# Patient Record
Sex: FEMALE | Race: WHITE | NOT HISPANIC OR LATINO | URBAN - METROPOLITAN AREA
[De-identification: names, ages, dates, MRNs, and addresses within clinical notes are randomized per-mention and may not be internally consistent; named-entity substitution may affect disease eponyms.]

---

## 2024-02-05 ENCOUNTER — NURSING HOME VISIT (OUTPATIENT)
Dept: POST ACUTE CARE | Facility: EXTERNAL LOCATION | Age: 79
End: 2024-02-05
Payer: COMMERCIAL

## 2024-02-05 DIAGNOSIS — G30.9 MILD ALZHEIMER'S DEMENTIA WITHOUT BEHAVIORAL DISTURBANCE, PSYCHOTIC DISTURBANCE, MOOD DISTURBANCE, OR ANXIETY, UNSPECIFIED TIMING OF DEMENTIA ONSET (MULTI): ICD-10-CM

## 2024-02-05 DIAGNOSIS — F02.A0 MILD ALZHEIMER'S DEMENTIA WITHOUT BEHAVIORAL DISTURBANCE, PSYCHOTIC DISTURBANCE, MOOD DISTURBANCE, OR ANXIETY, UNSPECIFIED TIMING OF DEMENTIA ONSET (MULTI): ICD-10-CM

## 2024-02-05 DIAGNOSIS — S32.511D CLOSED FRACTURE OF SUPERIOR RAMUS OF RIGHT PUBIS WITH ROUTINE HEALING, SUBSEQUENT ENCOUNTER: ICD-10-CM

## 2024-02-05 DIAGNOSIS — G89.29 OTHER CHRONIC PAIN: ICD-10-CM

## 2024-02-05 DIAGNOSIS — S32.591D CLOSED FRACTURE OF RIGHT INFERIOR PUBIC RAMUS WITH ROUTINE HEALING, SUBSEQUENT ENCOUNTER: ICD-10-CM

## 2024-02-05 DIAGNOSIS — E03.9 ACQUIRED HYPOTHYROIDISM: ICD-10-CM

## 2024-02-05 DIAGNOSIS — N18.9 CHRONIC KIDNEY DISEASE, UNSPECIFIED CKD STAGE: ICD-10-CM

## 2024-02-05 DIAGNOSIS — Z99.81 OXYGEN DEPENDENT: ICD-10-CM

## 2024-02-05 DIAGNOSIS — M79.89 LEG SWELLING: ICD-10-CM

## 2024-02-05 DIAGNOSIS — J43.9 PULMONARY EMPHYSEMA, UNSPECIFIED EMPHYSEMA TYPE (MULTI): ICD-10-CM

## 2024-02-05 DIAGNOSIS — I10 BENIGN ESSENTIAL HTN: ICD-10-CM

## 2024-02-05 DIAGNOSIS — M79.604 PAIN OF RIGHT LOWER EXTREMITY: Primary | ICD-10-CM

## 2024-02-05 PROBLEM — S32.591A: Status: ACTIVE | Noted: 2024-02-05

## 2024-02-05 PROCEDURE — 99497 ADVNCD CARE PLAN 30 MIN: CPT | Performed by: INTERNAL MEDICINE

## 2024-02-05 PROCEDURE — 99306 1ST NF CARE HIGH MDM 50: CPT | Performed by: INTERNAL MEDICINE

## 2024-02-05 NOTE — PROGRESS NOTES
Nursing Home  History & Physical Visit    Name: Rosa Maria Jason  MRN: [id number]  YOB: 1945  Date of Service: 2/5/2024      History of Present Illness  Pt was taken to hosp with leg pain and leg swelling more so on R side . She was in hosp recently with sup and inf rami fracture . She declined to go to rehab ctr after that and went home where she started to detoriate so went back to hosp. She was evaluated by therapist and suggested skilled care so she is her for it.   She has hx of dementia, on aricept for few years, hx of o2 dependent copd , uses 5 L of o2, ckd, HTN, hypothyroid   Lives with her daughter  Quit smoking since she has been on oxygen     Review of Systems  REVIEW OF SYSTEMS:   All other systems have been reviewed and are negative in relation to patient's complaint and other than what is mentioned in History of present illness.     Vital Signs  BP: 115/77 mmHg  2/5/2024 08:50  Temp:97.8 °F  2/5/2024 08:50  Pulse:79 bpm  2/5/2024 08:50  Weight:175 Lbs  2/2/2024 17:00  Resp:20 Breaths/min  2/5/2024 08:50  BS:  O2:92 %  2/5/2024 08:50  Pain:3  2/5/2024 08:4  Physical Exam  Vitals noted , on oxygen   Not in acute distress  Conj Pink, No icterus  Neck: No Cervical LN enlargement, No Thyroid enlargement   Lungs: good air entry bilaterally, no rales or rhonchi  CVS: S1 S2 + , no S3. No loud heart murmur heard.   Abdomen: Soft, non tender , BS +. no organomegaly , no CVA tenderness  CNS: Pt is alert, moving all 4 extremities. no motor weakness or abnormal movements noted on gross examination.  No spine tenderness  Extremities: + edema, No calf tenderness, Halina's sign negative. Some tenderness in R groin area .     Assessment/Plan:    1. Pain of right lower extremity    2. Leg swelling    3. Closed fracture of superior ramus of right pubis with routine healing, subsequent encounter    4. Closed fracture of right inferior pubic ramus with routine healing, subsequent encounter    5. Other chronic  pain    6. Pulmonary emphysema, unspecified emphysema type (CMS/[facility])    7. Oxygen dependent    8. 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 Benign essential HTN    10. Chronic kidney disease, unspecified CKD stage    11. Acquired hypothyroidism         Plan:     Available medical records reviewed . Patient was examined and detailed History and physical done . All questions answered to patient's satisfaction . Total time for chart reviewing , detailed examination and coordinating care with patient was > 35 minutes.   During visit today , I asked patient as well as looked in records  in regards to advanced directive , POA etc. Pt wants to be Full code . Questions related to it answered to pt's satisfaction .  She wants her daughter to be her health POA   Pain control  Patient is doing well.   Continue OT/PT Rehab.   Current medications are effective. advised to continue current medications.  Will continue to follow   ELOS 2 weeks   Patient felt satisfied with plan

## 2024-02-05 NOTE — LETTER
Patient: Rosa Maria Jason  : 1945    Encounter Date: 2024    Nursing Home  History & Physical Visit    Name: Rosa Maria Jason  MRN: [id number]  YOB: 1945  Date of Service: 2024      History of Present Illness  Pt was taken to hosp with leg pain and leg swelling more so on R side . She was in hosp recently with sup and inf rami fracture . She declined to go to rehab ctr after that and went home where she started to detoriate so went back to hosp. She was evaluated by therapist and suggested skilled care so she is her for it.   She has hx of dementia, on aricept for few years, hx of o2 dependent copd , uses 5 L of o2, ckd, HTN, hypothyroid   Lives with her daughter  Quit smoking since she has been on oxygen     Review of Systems  REVIEW OF SYSTEMS:   All other systems have been reviewed and are negative in relation to patient's complaint and other than what is mentioned in History of present illness.     Vital Signs  BP: 115/77 mmHg  2024 08:50  Temp:97.8 °F  2024 08:50  Pulse:79 bpm  2024 08:50  Weight:175 Lbs  2024 17:00  Resp:20 Breaths/min  2024 08:50  BS:  O2:92 %  2024 08:50  Pain:3  2024 08:4  Physical Exam  Vitals noted , on oxygen   Not in acute distress  Conj Pink, No icterus  Neck: No Cervical LN enlargement, No Thyroid enlargement   Lungs: good air entry bilaterally, no rales or rhonchi  CVS: S1 S2 + , no S3. No loud heart murmur heard.   Abdomen: Soft, non tender , BS +. no organomegaly , no CVA tenderness  CNS: Pt is alert, moving all 4 extremities. no motor weakness or abnormal movements noted on gross examination.  No spine tenderness  Extremities: + edema, No calf tenderness, Halina's sign negative. Some tenderness in R groin area .     Assessment/Plan:    1. Pain of right lower extremity    2. Leg swelling    3. Closed fracture of superior ramus of right pubis with routine healing, subsequent encounter    4. Closed fracture of right inferior pubic  ramus with routine healing, subsequent encounter    5. Other chronic pain    6. Pulmonary emphysema, unspecified emphysema type (CMS/HCC)    7. Oxygen dependent    8. 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 Benign essential HTN    10. Chronic kidney disease, unspecified CKD stage    11. Acquired hypothyroidism         Plan:     Available medical records reviewed . Patient was examined and detailed History and physical done . All questions answered to patient's satisfaction . Total time for chart reviewing , detailed examination and coordinating care with patient was > 35 minutes.   During visit today , I asked patient as well as looked in records  in regards to advanced directive , POA etc. Pt wants to be Full code . Questions related to it answered to pt's satisfaction .  She wants her daughter to be her health POA   Pain control  Patient is doing well.   Continue OT/PT Rehab.   Current medications are effective. advised to continue current medications.  Will continue to follow   ELOS 2 weeks   Patient felt satisfied with plan      Electronically Signed By: Blair Rousseau MD   2/5/24 12:51 PM

## 2024-02-09 ENCOUNTER — NURSING HOME VISIT (OUTPATIENT)
Dept: POST ACUTE CARE | Facility: EXTERNAL LOCATION | Age: 79
End: 2024-02-09
Payer: COMMERCIAL

## 2024-02-09 DIAGNOSIS — G30.9 MILD ALZHEIMER'S DEMENTIA WITHOUT BEHAVIORAL DISTURBANCE, PSYCHOTIC DISTURBANCE, MOOD DISTURBANCE, OR ANXIETY, UNSPECIFIED TIMING OF DEMENTIA ONSET (MULTI): ICD-10-CM

## 2024-02-09 DIAGNOSIS — Z99.81 OXYGEN DEPENDENT: ICD-10-CM

## 2024-02-09 DIAGNOSIS — M79.604 PAIN OF RIGHT LOWER EXTREMITY: Primary | ICD-10-CM

## 2024-02-09 DIAGNOSIS — S32.511D CLOSED FRACTURE OF SUPERIOR RAMUS OF RIGHT PUBIS WITH ROUTINE HEALING, SUBSEQUENT ENCOUNTER: ICD-10-CM

## 2024-02-09 DIAGNOSIS — F02.A0 MILD ALZHEIMER'S DEMENTIA WITHOUT BEHAVIORAL DISTURBANCE, PSYCHOTIC DISTURBANCE, MOOD DISTURBANCE, OR ANXIETY, UNSPECIFIED TIMING OF DEMENTIA ONSET (MULTI): ICD-10-CM

## 2024-02-09 DIAGNOSIS — S32.591D CLOSED FRACTURE OF RIGHT INFERIOR PUBIC RAMUS WITH ROUTINE HEALING, SUBSEQUENT ENCOUNTER: ICD-10-CM

## 2024-02-09 PROCEDURE — 99308 SBSQ NF CARE LOW MDM 20: CPT | Performed by: INTERNAL MEDICINE

## 2024-02-09 NOTE — LETTER
Patient: Rosa Maria Jason  : 1945    Encounter Date: 2024        Nursing home follow up visit  Name: Rosa Maria Jason  MRN: [id number]  YOB: 1945  Date of Service: 2024      Pt was evaluated during nursing home visit today  Patient is doing OK. Participating in therapy well  No sob, cp, PND, orthopnea  Eating ok, sleeping ok   Moving BM ok.   Tolerating medications well    Objective :  Vitals were noted  Patient is not any acute distress  Conjunctiva- Pink, no icterus   No cervical LN enlargement   Lungs clear, s1s2 +   No edema , No calf tenderness   Pleasantly demented.    Assessment:    1. Pain of right lower extremity -Improving    2. Closed fracture of superior ramus of right pubis with routine healing, subsequent encounter    3. Closed fracture of right inferior pubic ramus with routine healing, subsequent encounter    4. Oxygen dependent    5. 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   Continue OT/PT Rehab.   Current medications are effective. advised to continue current medications.  Will continue to follow   Patient felt satisfied with plan            Electronically Signed By: Blair Rousseau MD   24  4:46 PM

## 2024-02-11 NOTE — PROGRESS NOTES
Nursing home follow up visit  Name: Rosa Maria Jason  MRN: [id number]  YOB: 1945  Date of Service:2/9/24      Pt was evaluated during nursing home visit today  Patient is doing OK. Participating in therapy well  No sob, cp, PND, orthopnea  Eating ok, sleeping ok   Moving BM ok.   Tolerating medications well    Objective :  Vitals were noted  Patient is not any acute distress  Conjunctiva- Pink, no icterus   No cervical LN enlargement   Lungs clear, s1s2 +   No edema , No calf tenderness   Pleasantly demented.    Assessment:    1. Pain of right lower extremity -Improving    2. Closed fracture of superior ramus of right pubis with routine healing, subsequent encounter    3. Closed fracture of right inferior pubic ramus with routine healing, subsequent encounter    4. Oxygen dependent    5. 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   Continue OT/PT Rehab.   Current medications are effective. advised to continue current medications.  Will continue to follow   Patient felt satisfied with plan

## 2024-02-12 ENCOUNTER — NURSING HOME VISIT (OUTPATIENT)
Dept: POST ACUTE CARE | Facility: EXTERNAL LOCATION | Age: 79
End: 2024-02-12
Payer: COMMERCIAL

## 2024-02-12 DIAGNOSIS — G30.9 MILD ALZHEIMER'S DEMENTIA WITHOUT BEHAVIORAL DISTURBANCE, PSYCHOTIC DISTURBANCE, MOOD DISTURBANCE, OR ANXIETY, UNSPECIFIED TIMING OF DEMENTIA ONSET (MULTI): ICD-10-CM

## 2024-02-12 DIAGNOSIS — S32.511D CLOSED FRACTURE OF SUPERIOR RAMUS OF RIGHT PUBIS WITH ROUTINE HEALING, SUBSEQUENT ENCOUNTER: Primary | ICD-10-CM

## 2024-02-12 DIAGNOSIS — S32.591D CLOSED FRACTURE OF RIGHT INFERIOR PUBIC RAMUS WITH ROUTINE HEALING, SUBSEQUENT ENCOUNTER: ICD-10-CM

## 2024-02-12 DIAGNOSIS — J43.9 PULMONARY EMPHYSEMA, UNSPECIFIED EMPHYSEMA TYPE (MULTI): ICD-10-CM

## 2024-02-12 DIAGNOSIS — F02.A0 MILD ALZHEIMER'S DEMENTIA WITHOUT BEHAVIORAL DISTURBANCE, PSYCHOTIC DISTURBANCE, MOOD DISTURBANCE, OR ANXIETY, UNSPECIFIED TIMING OF DEMENTIA ONSET (MULTI): ICD-10-CM

## 2024-02-12 DIAGNOSIS — Z99.81 OXYGEN DEPENDENT: ICD-10-CM

## 2024-02-12 PROCEDURE — 99308 SBSQ NF CARE LOW MDM 20: CPT | Performed by: INTERNAL MEDICINE

## 2024-02-12 NOTE — PROGRESS NOTES
Nursing home follow up visit  Name: Rosa Maria Jason  MRN: [id number]  YOB: 1945  Date of Service: 2/12/2024      Pt was evaluated during nursing home visit today  Patient is doing OK. Participating in therapy well  No sob, cp, PND, orthopnea . Uses oxygen   Eating ok, sleeping ok   Moving BM ok.   Tolerating medications well    Objective :  Vitals were noted , on continuous o2 .   Patient is not any acute distress  Conjunctiva- Pink, no icterus   No cervical LN enlargement   Lungs clear, s1s2 +   No edema , No calf tenderness     Assessment:    1. Closed fracture of superior ramus of right pubis with routine healing, subsequent encounter    2. Closed fracture of right inferior pubic ramus with routine healing, subsequent encounter    3. Oxygen dependent    4. 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 Pulmonary emphysema, unspecified emphysema type (CMS/HCC)         Plan:  Patient is doing well.   Continue OT/PT Rehab.   Current medications are effective. advised to continue current medications.  Will continue to follow   Patient felt satisfied with plan

## 2024-02-12 NOTE — LETTER
Patient: Rosa Maria Jason  : 1945    Encounter Date: 2024        Nursing home follow up visit  Name: Rosa Maria Jason  MRN: [id number]  YOB: 1945  Date of Service: 2024      Pt was evaluated during nursing home visit today  Patient is doing OK. Participating in therapy well  No sob, cp, PND, orthopnea . Uses oxygen   Eating ok, sleeping ok   Moving BM ok.   Tolerating medications well    Objective :  Vitals were noted , on continuous o2 .   Patient is not any acute distress  Conjunctiva- Pink, no icterus   No cervical LN enlargement   Lungs clear, s1s2 +   No edema , No calf tenderness     Assessment:    1. Closed fracture of superior ramus of right pubis with routine healing, subsequent encounter    2. Closed fracture of right inferior pubic ramus with routine healing, subsequent encounter    3. Oxygen dependent    4. 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 Pulmonary emphysema, unspecified emphysema type (CMS/HCC)         Plan:  Patient is doing well.   Continue OT/PT Rehab.   Current medications are effective. advised to continue current medications.  Will continue to follow   Patient felt satisfied with plan            Electronically Signed By: Blair Rousseau MD   24  6:18 PM

## 2024-02-16 ENCOUNTER — NURSING HOME VISIT (OUTPATIENT)
Dept: POST ACUTE CARE | Facility: EXTERNAL LOCATION | Age: 79
End: 2024-02-16
Payer: COMMERCIAL

## 2024-02-16 DIAGNOSIS — J43.9 PULMONARY EMPHYSEMA, UNSPECIFIED EMPHYSEMA TYPE (MULTI): ICD-10-CM

## 2024-02-16 DIAGNOSIS — Z75.8 DISCHARGE PLANNING ISSUES: ICD-10-CM

## 2024-02-16 DIAGNOSIS — S32.511D CLOSED FRACTURE OF SUPERIOR RAMUS OF RIGHT PUBIS WITH ROUTINE HEALING, SUBSEQUENT ENCOUNTER: Primary | ICD-10-CM

## 2024-02-16 DIAGNOSIS — I10 BENIGN ESSENTIAL HTN: ICD-10-CM

## 2024-02-16 DIAGNOSIS — S32.591D CLOSED FRACTURE OF RIGHT INFERIOR PUBIC RAMUS WITH ROUTINE HEALING, SUBSEQUENT ENCOUNTER: ICD-10-CM

## 2024-02-16 DIAGNOSIS — Z99.81 OXYGEN DEPENDENT: ICD-10-CM

## 2024-02-16 PROCEDURE — 99316 NF DSCHRG MGMT 30 MIN+: CPT | Performed by: INTERNAL MEDICINE

## 2024-02-16 NOTE — LETTER
Patient: Rosa Maria Jason  : 1945    Encounter Date: 2024    Nursing home visit  Name: Rosa Maria Jason  MRN: [id number]  YOB: 1945  Date of Service: 24      Pt was evaluated for periodic clinical evaluation today   Patient is doing OK.  No sob, cp, PND, orthopnea  Eating ok, sleeping ok   Moving BM ok.   No significant pain issue  Tolerating medications well  She has improved in therapy . Able to walk with walker and able to go to bathroom by herself    Objective :  2024 22:50   Blood Pressure: 149 / 85 mmHg  Marylin Arellano RN    2024 22:50   O2 Saturation: 97 %  Marylni Arellano RN    2024 22:50   Pulse: 72 per minute  Marylin Arellano RN    2024 22:50   Respirations: 17 per minute  Marylin Arellano RN    2024 22:50   Temperature: 97.5 °F  Marylin Arellano RN    2024 22:49   Pain: 0 of 10  Vitals were noted  Patient is not any acute distress  Conjunctiva- Pink, no icterus   No cervical LN enlargement   Lungs clear, s1s2 +   No edema , No calf tenderness     Assessment:    1. Closed fracture of superior ramus of right pubis with routine healing, subsequent encounter    2. Pulmonary emphysema, unspecified emphysema type (CMS/HCC) -o2 dependent   3. Closed fracture of right inferior pubic ramus with routine healing, subsequent encounter    4. Oxygen dependent    5. Discharge planning issues    6. Benign essential HTN -controlled          Plan:  Patient is doing well.   All discharge related questions, medication questions were discussed with pt. Plan also discussed with . Total time > 35 min.     Current medications are effective. advised to continue current medications.  Plan is to discharge home tomorrow.   Patient felt satisfied with plan      Electronically Signed By: Blair Rousseau MD   24 12:20 AM

## 2024-02-18 NOTE — PROGRESS NOTES
Nursing home visit  Name: Rosa Maria Jason  MRN: [id number]  YOB: 1945  Date of Service: 2/16/24      Pt was evaluated for periodic clinical evaluation today   Patient is doing OK.  No sob, cp, PND, orthopnea  Eating ok, sleeping ok   Moving BM ok.   No significant pain issue  Tolerating medications well  She has improved in therapy . Able to walk with walker and able to go to bathroom by herself    Objective :  02/16/2024 22:50   Blood Pressure: 149 / 85 mmHg  Marylin Arellano RN    02/16/2024 22:50   O2 Saturation: 97 %  Marylin Arellano RN    02/16/2024 22:50   Pulse: 72 per minute  Marylin Arellano RN    02/16/2024 22:50   Respirations: 17 per minute  Marylin Arellano RN    02/16/2024 22:50   Temperature: 97.5 °F  Marylin Arellano RN    02/16/2024 22:49   Pain: 0 of 10  Vitals were noted  Patient is not any acute distress  Conjunctiva- Pink, no icterus   No cervical LN enlargement   Lungs clear, s1s2 +   No edema , No calf tenderness     Assessment:    1. Closed fracture of superior ramus of right pubis with routine healing, subsequent encounter    2. Pulmonary emphysema, unspecified emphysema type (CMS/HCC) -o2 dependent   3. Closed fracture of right inferior pubic ramus with routine healing, subsequent encounter    4. Oxygen dependent    5. Discharge planning issues    6. Benign essential HTN -controlled          Plan:  Patient is doing well.   All discharge related questions, medication questions were discussed with pt. Plan also discussed with . Total time > 35 min.     Current medications are effective. advised to continue current medications.  Plan is to discharge home tomorrow.   Patient felt satisfied with plan

## 2024-02-19 ENCOUNTER — NURSING HOME VISIT (OUTPATIENT)
Dept: POST ACUTE CARE | Facility: EXTERNAL LOCATION | Age: 79
End: 2024-02-19
Payer: COMMERCIAL

## 2024-02-19 DIAGNOSIS — S32.591D CLOSED FRACTURE OF RIGHT INFERIOR PUBIC RAMUS WITH ROUTINE HEALING, SUBSEQUENT ENCOUNTER: ICD-10-CM

## 2024-02-19 DIAGNOSIS — Z99.81 OXYGEN DEPENDENT: ICD-10-CM

## 2024-02-19 DIAGNOSIS — S32.511D CLOSED FRACTURE OF SUPERIOR RAMUS OF RIGHT PUBIS WITH ROUTINE HEALING, SUBSEQUENT ENCOUNTER: Primary | ICD-10-CM

## 2024-02-19 PROCEDURE — 99309 SBSQ NF CARE MODERATE MDM 30: CPT | Performed by: INTERNAL MEDICINE

## 2024-02-19 NOTE — LETTER
Patient: Rosa Maria Jason  : 1945    Encounter Date: 2024        Nursing home follow up visit  Name: Rosa Maria Jason  MRN: [id number]  YOB: 1945  Date of Service: 2024      Pt was evaluated during nursing home visit today  Patient is doing OK. Participating in therapy well  Her discharge was postponed as she still needs wheel chair arrangements to be made for her home . Also   She is supposed to have f/U xray on pubic bones for f/U on her recent fx and results will be sent to ortho.   No sob, cp, PND, orthopnea  Eating ok, sleeping ok   Moving BM ok.   Tolerating medications well    Objective :  Vitals were noted, stable  Patient is not any acute distress  Conjunctiva- Pink, no icterus   No cervical LN enlargement   Lungs clear, s1s2 +   Able to flex R leg ok. C/O some pain near groin with movements .   No edema , No calf tenderness     Assessment:    1. Closed fracture of superior ramus of right pubis with routine healing, subsequent encounter    2. Closed fracture of right inferior pubic ramus with routine healing, subsequent encounter    3. Oxygen dependent         Plan:  Patient is doing well.   Continue OT/PT Rehab.   Xray ordered to be done on 24. It will be sent to her Ortho to get input   Current medications are effective. advised to continue current medications.  Will continue to follow   Patient felt satisfied with plan            Electronically Signed By: Blair Rousseau MD   24 12:46 PM

## 2024-02-19 NOTE — PROGRESS NOTES
Nursing home follow up visit  Name: Rosa Maria Jason  MRN: [id number]  YOB: 1945  Date of Service: 2/19/2024      Pt was evaluated during nursing home visit today  Patient is doing OK. Participating in therapy well  Her discharge was postponed as she still needs wheel chair arrangements to be made for her home . Also   She is supposed to have f/U xray on pubic bones for f/U on her recent fx and results will be sent to ortho.   No sob, cp, PND, orthopnea  Eating ok, sleeping ok   Moving BM ok.   Tolerating medications well    Objective :  Vitals were noted, stable  Patient is not any acute distress  Conjunctiva- Pink, no icterus   No cervical LN enlargement   Lungs clear, s1s2 +   Able to flex R leg ok. C/O some pain near groin with movements .   No edema , No calf tenderness     Assessment:    1. Closed fracture of superior ramus of right pubis with routine healing, subsequent encounter    2. Closed fracture of right inferior pubic ramus with routine healing, subsequent encounter    3. Oxygen dependent         Plan:  Patient is doing well.   Continue OT/PT Rehab.   Xray ordered to be done on 2/21/24. It will be sent to her Ortho to get input   Current medications are effective. advised to continue current medications.  Will continue to follow   Patient felt satisfied with plan

## 2024-04-29 DIAGNOSIS — I10 BENIGN ESSENTIAL HTN: Primary | ICD-10-CM

## 2024-04-29 RX ORDER — POTASSIUM CHLORIDE 1500 MG/1
20 TABLET, EXTENDED RELEASE ORAL DAILY
COMMUNITY
Start: 2024-02-27 | End: 2024-04-29 | Stop reason: SDUPTHER

## 2024-04-29 RX ORDER — POTASSIUM CHLORIDE 1500 MG/1
20 TABLET, EXTENDED RELEASE ORAL DAILY
Qty: 90 TABLET | Refills: 3 | Status: SHIPPED | OUTPATIENT
Start: 2024-04-29 | End: 2024-05-13 | Stop reason: SDUPTHER

## 2024-05-13 DIAGNOSIS — I10 BENIGN ESSENTIAL HTN: ICD-10-CM

## 2024-05-13 RX ORDER — POTASSIUM CHLORIDE 1500 MG/1
20 TABLET, EXTENDED RELEASE ORAL DAILY
Qty: 90 TABLET | Refills: 3 | Status: SHIPPED | OUTPATIENT
Start: 2024-05-13

## 2024-05-13 RX ORDER — DONEPEZIL HYDROCHLORIDE 5 MG/1
5 TABLET, FILM COATED ORAL NIGHTLY
COMMUNITY
End: 2024-05-13 | Stop reason: SDUPTHER

## 2024-05-13 RX ORDER — DONEPEZIL HYDROCHLORIDE 5 MG/1
5 TABLET, FILM COATED ORAL NIGHTLY
Qty: 90 TABLET | Refills: 3 | Status: SHIPPED | OUTPATIENT
Start: 2024-05-13

## 2024-08-03 DIAGNOSIS — E78.5 HYPERLIPIDEMIA, UNSPECIFIED HYPERLIPIDEMIA TYPE: Primary | ICD-10-CM

## 2024-08-05 RX ORDER — ATORVASTATIN CALCIUM 20 MG/1
20 TABLET, FILM COATED ORAL NIGHTLY
Qty: 30 TABLET | Refills: 11 | Status: SHIPPED | OUTPATIENT
Start: 2024-08-05

## 2025-01-07 ENCOUNTER — APPOINTMENT (OUTPATIENT)
Dept: ORTHOPEDIC SURGERY | Facility: HOSPITAL | Age: 80
End: 2025-01-07
Payer: COMMERCIAL

## 2025-01-17 ENCOUNTER — APPOINTMENT (OUTPATIENT)
Dept: RADIOLOGY | Facility: HOSPITAL | Age: 80
End: 2025-01-17
Payer: COMMERCIAL

## 2025-04-01 ENCOUNTER — APPOINTMENT (OUTPATIENT)
Dept: RADIOLOGY | Facility: HOSPITAL | Age: 80
End: 2025-04-01
Payer: COMMERCIAL

## 2025-04-01 ENCOUNTER — APPOINTMENT (OUTPATIENT)
Dept: CARDIOLOGY | Facility: HOSPITAL | Age: 80
End: 2025-04-01
Payer: COMMERCIAL

## 2025-04-01 ENCOUNTER — HOSPITAL ENCOUNTER (INPATIENT)
Facility: HOSPITAL | Age: 80
LOS: 1 days | Discharge: HOME | End: 2025-04-03
Attending: EMERGENCY MEDICINE | Admitting: INTERNAL MEDICINE
Payer: COMMERCIAL

## 2025-04-01 DIAGNOSIS — E87.29 RESPIRATORY ACIDOSIS: ICD-10-CM

## 2025-04-01 DIAGNOSIS — J18.9 COMMUNITY ACQUIRED PNEUMONIA OF LEFT LOWER LOBE OF LUNG: ICD-10-CM

## 2025-04-01 DIAGNOSIS — R41.82 ALTERED MENTAL STATUS, UNSPECIFIED ALTERED MENTAL STATUS TYPE: Primary | ICD-10-CM

## 2025-04-01 LAB
ALBUMIN SERPL BCP-MCNC: 3.4 G/DL (ref 3.4–5)
ALP SERPL-CCNC: 42 U/L (ref 33–136)
ALT SERPL W P-5'-P-CCNC: 10 U/L (ref 7–45)
ANION GAP BLDV CALCULATED.4IONS-SCNC: 5 MMOL/L (ref 10–25)
ANION GAP SERPL CALC-SCNC: 10 MMOL/L (ref 10–20)
AST SERPL W P-5'-P-CCNC: 15 U/L (ref 9–39)
BASE EXCESS BLDV CALC-SCNC: 3.5 MMOL/L (ref -2–3)
BASOPHILS # BLD AUTO: 0.04 X10*3/UL (ref 0–0.1)
BASOPHILS NFR BLD AUTO: 0.5 %
BILIRUB SERPL-MCNC: 0.3 MG/DL (ref 0–1.2)
BODY TEMPERATURE: 37 DEGREES CELSIUS
BUN SERPL-MCNC: 20 MG/DL (ref 6–23)
CA-I BLDV-SCNC: 1.12 MMOL/L (ref 1.1–1.33)
CALCIUM SERPL-MCNC: 7.6 MG/DL (ref 8.6–10.3)
CARDIAC TROPONIN I PNL SERPL HS: 3 NG/L (ref 0–13)
CARDIAC TROPONIN I PNL SERPL HS: 3 NG/L (ref 0–13)
CHLORIDE BLDV-SCNC: 106 MMOL/L (ref 98–107)
CHLORIDE SERPL-SCNC: 107 MMOL/L (ref 98–107)
CO2 SERPL-SCNC: 28 MMOL/L (ref 21–32)
CREAT SERPL-MCNC: 1.55 MG/DL (ref 0.5–1.05)
EGFRCR SERPLBLD CKD-EPI 2021: 34 ML/MIN/1.73M*2
EOSINOPHIL # BLD AUTO: 0.2 X10*3/UL (ref 0–0.4)
EOSINOPHIL NFR BLD AUTO: 2.4 %
ERYTHROCYTE [DISTWIDTH] IN BLOOD BY AUTOMATED COUNT: 15.5 % (ref 11.5–14.5)
FLUAV RNA RESP QL NAA+PROBE: NOT DETECTED
FLUBV RNA RESP QL NAA+PROBE: NOT DETECTED
GLUCOSE BLD MANUAL STRIP-MCNC: 85 MG/DL (ref 74–99)
GLUCOSE BLDV-MCNC: 79 MG/DL (ref 74–99)
GLUCOSE SERPL-MCNC: 79 MG/DL (ref 74–99)
HCO3 BLDV-SCNC: 31 MMOL/L (ref 22–26)
HCT VFR BLD AUTO: 30.1 % (ref 36–46)
HCT VFR BLD EST: 29 % (ref 36–46)
HGB BLD-MCNC: 9.4 G/DL (ref 12–16)
HGB BLDV-MCNC: 9.7 G/DL (ref 12–16)
IMM GRANULOCYTES # BLD AUTO: 0.03 X10*3/UL (ref 0–0.5)
IMM GRANULOCYTES NFR BLD AUTO: 0.4 % (ref 0–0.9)
INHALED O2 CONCENTRATION: 40 %
LACTATE BLDV-SCNC: 1.7 MMOL/L (ref 0.4–2)
LACTATE SERPL-SCNC: 1.7 MMOL/L (ref 0.4–2)
LYMPHOCYTES # BLD AUTO: 2.15 X10*3/UL (ref 0.8–3)
LYMPHOCYTES NFR BLD AUTO: 26 %
MCH RBC QN AUTO: 33.1 PG (ref 26–34)
MCHC RBC AUTO-ENTMCNC: 31.2 G/DL (ref 32–36)
MCV RBC AUTO: 106 FL (ref 80–100)
MONOCYTES # BLD AUTO: 0.96 X10*3/UL (ref 0.05–0.8)
MONOCYTES NFR BLD AUTO: 11.6 %
NEUTROPHILS # BLD AUTO: 4.9 X10*3/UL (ref 1.6–5.5)
NEUTROPHILS NFR BLD AUTO: 59.1 %
NRBC BLD-RTO: 0 /100 WBCS (ref 0–0)
OXYHGB MFR BLDV: 75.2 % (ref 45–75)
PCO2 BLDV: 63 MM HG (ref 41–51)
PH BLDV: 7.3 PH (ref 7.33–7.43)
PLATELET # BLD AUTO: 192 X10*3/UL (ref 150–450)
PO2 BLDV: 49 MM HG (ref 35–45)
POTASSIUM BLDV-SCNC: 3.9 MMOL/L (ref 3.5–5.3)
POTASSIUM SERPL-SCNC: 3.9 MMOL/L (ref 3.5–5.3)
PROT SERPL-MCNC: 5.4 G/DL (ref 6.4–8.2)
RBC # BLD AUTO: 2.84 X10*6/UL (ref 4–5.2)
SAO2 % BLDV: 76 % (ref 45–75)
SARS-COV-2 RNA RESP QL NAA+PROBE: NOT DETECTED
SODIUM BLDV-SCNC: 138 MMOL/L (ref 136–145)
SODIUM SERPL-SCNC: 141 MMOL/L (ref 136–145)
WBC # BLD AUTO: 8.3 X10*3/UL (ref 4.4–11.3)

## 2025-04-01 PROCEDURE — 71045 X-RAY EXAM CHEST 1 VIEW: CPT | Performed by: RADIOLOGY

## 2025-04-01 PROCEDURE — 82435 ASSAY OF BLOOD CHLORIDE: CPT | Performed by: EMERGENCY MEDICINE

## 2025-04-01 PROCEDURE — 2500000004 HC RX 250 GENERAL PHARMACY W/ HCPCS (ALT 636 FOR OP/ED): Mod: JZ | Performed by: EMERGENCY MEDICINE

## 2025-04-01 PROCEDURE — 84484 ASSAY OF TROPONIN QUANT: CPT | Performed by: EMERGENCY MEDICINE

## 2025-04-01 PROCEDURE — 96365 THER/PROPH/DIAG IV INF INIT: CPT

## 2025-04-01 PROCEDURE — 70450 CT HEAD/BRAIN W/O DYE: CPT

## 2025-04-01 PROCEDURE — 87075 CULTR BACTERIA EXCEPT BLOOD: CPT | Mod: AHULAB | Performed by: EMERGENCY MEDICINE

## 2025-04-01 PROCEDURE — 99285 EMERGENCY DEPT VISIT HI MDM: CPT | Mod: 25 | Performed by: EMERGENCY MEDICINE

## 2025-04-01 PROCEDURE — 36415 COLL VENOUS BLD VENIPUNCTURE: CPT | Performed by: EMERGENCY MEDICINE

## 2025-04-01 PROCEDURE — 93005 ELECTROCARDIOGRAM TRACING: CPT

## 2025-04-01 PROCEDURE — 82947 ASSAY GLUCOSE BLOOD QUANT: CPT

## 2025-04-01 PROCEDURE — 85025 COMPLETE CBC W/AUTO DIFF WBC: CPT | Performed by: EMERGENCY MEDICINE

## 2025-04-01 PROCEDURE — 87636 SARSCOV2 & INF A&B AMP PRB: CPT | Performed by: EMERGENCY MEDICINE

## 2025-04-01 PROCEDURE — 96361 HYDRATE IV INFUSION ADD-ON: CPT

## 2025-04-01 PROCEDURE — 71045 X-RAY EXAM CHEST 1 VIEW: CPT

## 2025-04-01 PROCEDURE — 80053 COMPREHEN METABOLIC PANEL: CPT | Performed by: EMERGENCY MEDICINE

## 2025-04-01 PROCEDURE — 96374 THER/PROPH/DIAG INJ IV PUSH: CPT | Mod: 59

## 2025-04-01 PROCEDURE — 2500000002 HC RX 250 W HCPCS SELF ADMINISTERED DRUGS (ALT 637 FOR MEDICARE OP, ALT 636 FOR OP/ED): Performed by: EMERGENCY MEDICINE

## 2025-04-01 PROCEDURE — 84132 ASSAY OF SERUM POTASSIUM: CPT | Performed by: EMERGENCY MEDICINE

## 2025-04-01 PROCEDURE — 2500000005 HC RX 250 GENERAL PHARMACY W/O HCPCS: Performed by: EMERGENCY MEDICINE

## 2025-04-01 PROCEDURE — 70450 CT HEAD/BRAIN W/O DYE: CPT | Performed by: STUDENT IN AN ORGANIZED HEALTH CARE EDUCATION/TRAINING PROGRAM

## 2025-04-01 PROCEDURE — 83605 ASSAY OF LACTIC ACID: CPT | Performed by: EMERGENCY MEDICINE

## 2025-04-01 PROCEDURE — 94640 AIRWAY INHALATION TREATMENT: CPT

## 2025-04-01 PROCEDURE — 96367 TX/PROPH/DG ADDL SEQ IV INF: CPT

## 2025-04-01 RX ORDER — IPRATROPIUM BROMIDE AND ALBUTEROL SULFATE 2.5; .5 MG/3ML; MG/3ML
3 SOLUTION RESPIRATORY (INHALATION) ONCE
Status: COMPLETED | OUTPATIENT
Start: 2025-04-01 | End: 2025-04-01

## 2025-04-01 RX ADMIN — IPRATROPIUM BROMIDE AND ALBUTEROL SULFATE 3 ML: 2.5; .5 SOLUTION RESPIRATORY (INHALATION) at 19:12

## 2025-04-01 RX ADMIN — PIPERACILLIN SODIUM AND TAZOBACTAM SODIUM 3.38 G: 3; .375 INJECTION, SOLUTION INTRAVENOUS at 19:59

## 2025-04-01 RX ADMIN — SODIUM CHLORIDE 1000 ML: 0.9 INJECTION, SOLUTION INTRAVENOUS at 18:30

## 2025-04-01 RX ADMIN — VANCOMYCIN HYDROCHLORIDE 1.5 G: 5 INJECTION, POWDER, LYOPHILIZED, FOR SOLUTION INTRAVENOUS at 20:45

## 2025-04-01 RX ADMIN — METHYLPREDNISOLONE SODIUM SUCCINATE 125 MG: 125 INJECTION, POWDER, FOR SOLUTION INTRAMUSCULAR; INTRAVENOUS at 19:59

## 2025-04-01 RX ADMIN — SODIUM CHLORIDE 1400 ML: 9 INJECTION, SOLUTION INTRAVENOUS at 19:58

## 2025-04-01 ASSESSMENT — COLUMBIA-SUICIDE SEVERITY RATING SCALE - C-SSRS
6. HAVE YOU EVER DONE ANYTHING, STARTED TO DO ANYTHING, OR PREPARED TO DO ANYTHING TO END YOUR LIFE?: NO
1. IN THE PAST MONTH, HAVE YOU WISHED YOU WERE DEAD OR WISHED YOU COULD GO TO SLEEP AND NOT WAKE UP?: NO
2. HAVE YOU ACTUALLY HAD ANY THOUGHTS OF KILLING YOURSELF?: NO

## 2025-04-01 ASSESSMENT — PAIN SCALES - GENERAL
PAINLEVEL_OUTOF10: 0 - NO PAIN
PAINLEVEL_OUTOF10: 0 - NO PAIN

## 2025-04-01 ASSESSMENT — PAIN - FUNCTIONAL ASSESSMENT: PAIN_FUNCTIONAL_ASSESSMENT: 0-10

## 2025-04-01 NOTE — ED TRIAGE NOTES
Patient presents to the ED by EMS from home for lethargy and tremors. EMS reports patient's daughter went to check on the patient and the patient had been laying in bed all day. Daughter reported tremors. Patient is A&Ox4. Denies CP, SOB.

## 2025-04-02 PROBLEM — R41.82 ALTERED MENTAL STATUS, UNSPECIFIED ALTERED MENTAL STATUS TYPE: Status: ACTIVE | Noted: 2025-04-02

## 2025-04-02 LAB
ANION GAP SERPL CALC-SCNC: 12 MMOL/L (ref 10–20)
APPEARANCE UR: CLEAR
ATRIAL RATE: 58 BPM
BILIRUB UR STRIP.AUTO-MCNC: NEGATIVE MG/DL
BUN SERPL-MCNC: 17 MG/DL (ref 6–23)
CALCIUM SERPL-MCNC: 7.6 MG/DL (ref 8.6–10.3)
CHLORIDE SERPL-SCNC: 107 MMOL/L (ref 98–107)
CO2 SERPL-SCNC: 24 MMOL/L (ref 21–32)
COLOR UR: ABNORMAL
CREAT SERPL-MCNC: 1.08 MG/DL (ref 0.5–1.05)
EGFRCR SERPLBLD CKD-EPI 2021: 53 ML/MIN/1.73M*2
ERYTHROCYTE [DISTWIDTH] IN BLOOD BY AUTOMATED COUNT: 15.4 % (ref 11.5–14.5)
GLUCOSE SERPL-MCNC: 143 MG/DL (ref 74–99)
GLUCOSE UR STRIP.AUTO-MCNC: NORMAL MG/DL
HCT VFR BLD AUTO: 29.2 % (ref 36–46)
HGB BLD-MCNC: 9.3 G/DL (ref 12–16)
KETONES UR STRIP.AUTO-MCNC: NEGATIVE MG/DL
LEUKOCYTE ESTERASE UR QL STRIP.AUTO: ABNORMAL
MCH RBC QN AUTO: 33.3 PG (ref 26–34)
MCHC RBC AUTO-ENTMCNC: 31.8 G/DL (ref 32–36)
MCV RBC AUTO: 105 FL (ref 80–100)
MRSA DNA SPEC QL NAA+PROBE: DETECTED
NITRITE UR QL STRIP.AUTO: NEGATIVE
NRBC BLD-RTO: 0 /100 WBCS (ref 0–0)
P AXIS: 28 DEGREES
P OFFSET: 181 MS
P ONSET: 127 MS
PH UR STRIP.AUTO: 5.5 [PH]
PLATELET # BLD AUTO: 185 X10*3/UL (ref 150–450)
POTASSIUM SERPL-SCNC: 4 MMOL/L (ref 3.5–5.3)
PR INTERVAL: 196 MS
PROT UR STRIP.AUTO-MCNC: NEGATIVE MG/DL
Q ONSET: 225 MS
QRS COUNT: 10 BEATS
QRS DURATION: 76 MS
QT INTERVAL: 480 MS
QTC CALCULATION(BAZETT): 471 MS
QTC FREDERICIA: 474 MS
R AXIS: -9 DEGREES
RBC # BLD AUTO: 2.79 X10*6/UL (ref 4–5.2)
RBC # UR STRIP.AUTO: NEGATIVE MG/DL
RBC #/AREA URNS AUTO: NORMAL /HPF
SODIUM SERPL-SCNC: 139 MMOL/L (ref 136–145)
SP GR UR STRIP.AUTO: 1.02
T AXIS: -3 DEGREES
T OFFSET: 465 MS
UROBILINOGEN UR STRIP.AUTO-MCNC: NORMAL MG/DL
VENTRICULAR RATE: 58 BPM
WBC # BLD AUTO: 9.3 X10*3/UL (ref 4.4–11.3)
WBC #/AREA URNS AUTO: NORMAL /HPF

## 2025-04-02 PROCEDURE — 87641 MR-STAPH DNA AMP PROBE: CPT | Performed by: INTERNAL MEDICINE

## 2025-04-02 PROCEDURE — 1200000002 HC GENERAL ROOM WITH TELEMETRY DAILY

## 2025-04-02 PROCEDURE — 87640 STAPH A DNA AMP PROBE: CPT | Performed by: INTERNAL MEDICINE

## 2025-04-02 PROCEDURE — 2500000004 HC RX 250 GENERAL PHARMACY W/ HCPCS (ALT 636 FOR OP/ED): Performed by: INTERNAL MEDICINE

## 2025-04-02 PROCEDURE — 2500000005 HC RX 250 GENERAL PHARMACY W/O HCPCS: Performed by: INTERNAL MEDICINE

## 2025-04-02 PROCEDURE — 2500000001 HC RX 250 WO HCPCS SELF ADMINISTERED DRUGS (ALT 637 FOR MEDICARE OP): Performed by: PSYCHIATRY & NEUROLOGY

## 2025-04-02 PROCEDURE — 87086 URINE CULTURE/COLONY COUNT: CPT | Mod: AHULAB | Performed by: EMERGENCY MEDICINE

## 2025-04-02 PROCEDURE — 9420000001 HC RT PATIENT EDUCATION 5 MIN

## 2025-04-02 PROCEDURE — 85027 COMPLETE CBC AUTOMATED: CPT | Performed by: INTERNAL MEDICINE

## 2025-04-02 PROCEDURE — 2500000001 HC RX 250 WO HCPCS SELF ADMINISTERED DRUGS (ALT 637 FOR MEDICARE OP): Performed by: INTERNAL MEDICINE

## 2025-04-02 PROCEDURE — 81001 URINALYSIS AUTO W/SCOPE: CPT | Performed by: EMERGENCY MEDICINE

## 2025-04-02 PROCEDURE — 80048 BASIC METABOLIC PNL TOTAL CA: CPT | Performed by: INTERNAL MEDICINE

## 2025-04-02 PROCEDURE — 94640 AIRWAY INHALATION TREATMENT: CPT

## 2025-04-02 PROCEDURE — 36415 COLL VENOUS BLD VENIPUNCTURE: CPT | Performed by: INTERNAL MEDICINE

## 2025-04-02 PROCEDURE — 2500000002 HC RX 250 W HCPCS SELF ADMINISTERED DRUGS (ALT 637 FOR MEDICARE OP, ALT 636 FOR OP/ED): Performed by: INTERNAL MEDICINE

## 2025-04-02 PROCEDURE — 99232 SBSQ HOSP IP/OBS MODERATE 35: CPT | Performed by: INTERNAL MEDICINE

## 2025-04-02 RX ORDER — ICOSAPENT ETHYL 1 G/1
2 CAPSULE ORAL
Status: DISCONTINUED | OUTPATIENT
Start: 2025-04-02 | End: 2025-04-03 | Stop reason: HOSPADM

## 2025-04-02 RX ORDER — LANOLIN ALCOHOL/MO/W.PET/CERES
100 CREAM (GRAM) TOPICAL DAILY
Status: ON HOLD | COMMUNITY
End: 2025-04-03

## 2025-04-02 RX ORDER — FERROUS SULFATE 325(65) MG
1 TABLET ORAL DAILY
Status: DISCONTINUED | OUTPATIENT
Start: 2025-04-02 | End: 2025-04-03 | Stop reason: HOSPADM

## 2025-04-02 RX ORDER — ACETAMINOPHEN 650 MG/1
650 SUPPOSITORY RECTAL EVERY 4 HOURS PRN
Status: DISCONTINUED | OUTPATIENT
Start: 2025-04-02 | End: 2025-04-03 | Stop reason: HOSPADM

## 2025-04-02 RX ORDER — ICOSAPENT ETHYL 1 G/1
1 CAPSULE ORAL
Status: DISCONTINUED | OUTPATIENT
Start: 2025-04-02 | End: 2025-04-02

## 2025-04-02 RX ORDER — FENTANYL 50 UG/1
1 PATCH TRANSDERMAL
Status: DISCONTINUED | OUTPATIENT
Start: 2025-04-02 | End: 2025-04-03 | Stop reason: HOSPADM

## 2025-04-02 RX ORDER — ATORVASTATIN CALCIUM 20 MG/1
1 TABLET, FILM COATED ORAL DAILY
Status: ON HOLD | COMMUNITY
End: 2025-04-02 | Stop reason: ENTERED-IN-ERROR

## 2025-04-02 RX ORDER — TRAMADOL HYDROCHLORIDE 50 MG/1
50 TABLET ORAL EVERY 12 HOURS PRN
Status: DISCONTINUED | OUTPATIENT
Start: 2025-04-02 | End: 2025-04-02

## 2025-04-02 RX ORDER — AMLODIPINE AND BENAZEPRIL HYDROCHLORIDE 10; 20 MG/1; MG/1
1 CAPSULE ORAL DAILY
COMMUNITY
Start: 2025-02-06

## 2025-04-02 RX ORDER — CARISOPRODOL 350 MG/1
350 TABLET ORAL 3 TIMES DAILY PRN
COMMUNITY

## 2025-04-02 RX ORDER — BUSPIRONE HYDROCHLORIDE 10 MG/1
10 TABLET ORAL EVERY 12 HOURS
Status: DISCONTINUED | OUTPATIENT
Start: 2025-04-02 | End: 2025-04-03

## 2025-04-02 RX ORDER — ACETAMINOPHEN 325 MG/1
650 TABLET ORAL EVERY 4 HOURS PRN
Status: DISCONTINUED | OUTPATIENT
Start: 2025-04-02 | End: 2025-04-03 | Stop reason: HOSPADM

## 2025-04-02 RX ORDER — VENLAFAXINE HYDROCHLORIDE 75 MG/1
150 CAPSULE, EXTENDED RELEASE ORAL DAILY
Status: DISCONTINUED | OUTPATIENT
Start: 2025-04-02 | End: 2025-04-02

## 2025-04-02 RX ORDER — BUDESONIDE, GLYCOPYRROLATE, AND FORMOTEROL FUMARATE 160; 9; 4.8 UG/1; UG/1; UG/1
2 AEROSOL, METERED RESPIRATORY (INHALATION)
COMMUNITY
Start: 2024-01-18

## 2025-04-02 RX ORDER — MEMANTINE HYDROCHLORIDE 10 MG/1
10 TABLET ORAL DAILY
COMMUNITY
Start: 2024-02-03

## 2025-04-02 RX ORDER — ONDANSETRON 4 MG/1
4 TABLET, FILM COATED ORAL EVERY 8 HOURS PRN
Status: DISCONTINUED | OUTPATIENT
Start: 2025-04-02 | End: 2025-04-03 | Stop reason: HOSPADM

## 2025-04-02 RX ORDER — PANTOPRAZOLE SODIUM 40 MG/1
40 TABLET, DELAYED RELEASE ORAL
Status: DISCONTINUED | OUTPATIENT
Start: 2025-04-02 | End: 2025-04-03 | Stop reason: HOSPADM

## 2025-04-02 RX ORDER — TRAMADOL HYDROCHLORIDE 50 MG/1
50 TABLET ORAL EVERY 12 HOURS PRN
COMMUNITY
Start: 2025-04-01 | End: 2025-04-03 | Stop reason: HOSPADM

## 2025-04-02 RX ORDER — MEMANTINE HYDROCHLORIDE 5 MG/1
10 TABLET ORAL DAILY
Status: DISCONTINUED | OUTPATIENT
Start: 2025-04-03 | End: 2025-04-03 | Stop reason: HOSPADM

## 2025-04-02 RX ORDER — MULTIVITAMIN
1 TABLET ORAL DAILY
COMMUNITY

## 2025-04-02 RX ORDER — ATORVASTATIN CALCIUM 20 MG/1
20 TABLET, FILM COATED ORAL DAILY
Status: DISCONTINUED | OUTPATIENT
Start: 2025-04-02 | End: 2025-04-03 | Stop reason: HOSPADM

## 2025-04-02 RX ORDER — ASPIRIN 81 MG/1
81 TABLET ORAL EVERY 24 HOURS
Status: DISCONTINUED | OUTPATIENT
Start: 2025-04-02 | End: 2025-04-03 | Stop reason: HOSPADM

## 2025-04-02 RX ORDER — ICOSAPENT ETHYL 1 G/1
1 CAPSULE ORAL
COMMUNITY

## 2025-04-02 RX ORDER — LANOLIN ALCOHOL/MO/W.PET/CERES
400 CREAM (GRAM) TOPICAL DAILY
Status: DISCONTINUED | OUTPATIENT
Start: 2025-04-02 | End: 2025-04-03 | Stop reason: HOSPADM

## 2025-04-02 RX ORDER — VENLAFAXINE HYDROCHLORIDE 150 MG/1
150 CAPSULE, EXTENDED RELEASE ORAL
COMMUNITY
End: 2025-04-03 | Stop reason: HOSPADM

## 2025-04-02 RX ORDER — ACETAMINOPHEN 160 MG/5ML
650 SOLUTION ORAL EVERY 4 HOURS PRN
Status: DISCONTINUED | OUTPATIENT
Start: 2025-04-02 | End: 2025-04-03 | Stop reason: HOSPADM

## 2025-04-02 RX ORDER — VANCOMYCIN HYDROCHLORIDE 1 G/20ML
INJECTION, POWDER, LYOPHILIZED, FOR SOLUTION INTRAVENOUS DAILY PRN
Status: DISCONTINUED | OUTPATIENT
Start: 2025-04-02 | End: 2025-04-03 | Stop reason: HOSPADM

## 2025-04-02 RX ORDER — METOPROLOL TARTRATE AND HYDROCHLOROTHIAZIDE 50; 25 MG/1; MG/1
1 TABLET ORAL DAILY
COMMUNITY
Start: 2025-02-07 | End: 2025-05-08

## 2025-04-02 RX ORDER — FENTANYL 50 UG/1
1 PATCH TRANSDERMAL
COMMUNITY
Start: 2024-02-03

## 2025-04-02 RX ORDER — ARIPIPRAZOLE 5 MG/1
5 TABLET ORAL DAILY
Status: ON HOLD | COMMUNITY
End: 2025-04-02 | Stop reason: ENTERED-IN-ERROR

## 2025-04-02 RX ORDER — TRAZODONE HYDROCHLORIDE 50 MG/1
200 TABLET ORAL DAILY PRN
Status: DISCONTINUED | OUTPATIENT
Start: 2025-04-02 | End: 2025-04-02

## 2025-04-02 RX ORDER — ASPIRIN 81 MG/1
81 TABLET ORAL EVERY 24 HOURS
COMMUNITY
Start: 2024-02-03

## 2025-04-02 RX ORDER — MEMANTINE HYDROCHLORIDE 5 MG/1
10 TABLET ORAL 2 TIMES DAILY
Status: DISCONTINUED | OUTPATIENT
Start: 2025-04-02 | End: 2025-04-02

## 2025-04-02 RX ORDER — CLONAZEPAM 0.5 MG/1
0.5 TABLET ORAL 2 TIMES DAILY PRN
COMMUNITY
Start: 2024-02-02

## 2025-04-02 RX ORDER — DEXTROMETHORPHAN HBR, GUAIFENESIN 20; 200 MG/10ML; MG/10ML
LIQUID ORAL
COMMUNITY
Start: 2025-03-19

## 2025-04-02 RX ORDER — VENLAFAXINE HYDROCHLORIDE 37.5 MG/1
150 CAPSULE, EXTENDED RELEASE ORAL
Status: DISCONTINUED | OUTPATIENT
Start: 2025-04-02 | End: 2025-04-02

## 2025-04-02 RX ORDER — MIDODRINE HYDROCHLORIDE 5 MG/1
5 TABLET ORAL EVERY 8 HOURS
Status: DISCONTINUED | OUTPATIENT
Start: 2025-04-02 | End: 2025-04-03 | Stop reason: HOSPADM

## 2025-04-02 RX ORDER — GABAPENTIN 400 MG/1
800 CAPSULE ORAL 2 TIMES DAILY
Status: DISCONTINUED | OUTPATIENT
Start: 2025-04-02 | End: 2025-04-02

## 2025-04-02 RX ORDER — LANOLIN ALCOHOL/MO/W.PET/CERES
1 CREAM (GRAM) TOPICAL DAILY
COMMUNITY

## 2025-04-02 RX ORDER — SODIUM CHLORIDE 9 MG/ML
100 INJECTION, SOLUTION INTRAVENOUS CONTINUOUS
Status: ACTIVE | OUTPATIENT
Start: 2025-04-02 | End: 2025-04-03

## 2025-04-02 RX ORDER — FLUTICASONE FUROATE AND VILANTEROL 100; 25 UG/1; UG/1
1 POWDER RESPIRATORY (INHALATION)
Status: DISCONTINUED | OUTPATIENT
Start: 2025-04-02 | End: 2025-04-02 | Stop reason: WASHOUT

## 2025-04-02 RX ORDER — GABAPENTIN 400 MG/1
800 CAPSULE ORAL 4 TIMES DAILY
Status: DISCONTINUED | OUTPATIENT
Start: 2025-04-02 | End: 2025-04-02

## 2025-04-02 RX ORDER — DONEPEZIL HYDROCHLORIDE 5 MG/1
5 TABLET, FILM COATED ORAL NIGHTLY
Status: DISCONTINUED | OUTPATIENT
Start: 2025-04-02 | End: 2025-04-02 | Stop reason: WASHOUT

## 2025-04-02 RX ORDER — VENLAFAXINE 25 MG/1
50 TABLET ORAL
Status: DISCONTINUED | OUTPATIENT
Start: 2025-04-03 | End: 2025-04-03 | Stop reason: HOSPADM

## 2025-04-02 RX ORDER — GABAPENTIN 300 MG/1
300 CAPSULE ORAL 2 TIMES DAILY
Status: DISCONTINUED | OUTPATIENT
Start: 2025-04-02 | End: 2025-04-03 | Stop reason: HOSPADM

## 2025-04-02 RX ORDER — IPRATROPIUM BROMIDE AND ALBUTEROL SULFATE 2.5; .5 MG/3ML; MG/3ML
3 SOLUTION RESPIRATORY (INHALATION) EVERY 2 HOUR PRN
Status: DISCONTINUED | OUTPATIENT
Start: 2025-04-02 | End: 2025-04-03 | Stop reason: HOSPADM

## 2025-04-02 RX ORDER — DONEPEZIL HYDROCHLORIDE 5 MG/1
23 TABLET, FILM COATED ORAL NIGHTLY
Status: DISCONTINUED | OUTPATIENT
Start: 2025-04-02 | End: 2025-04-03 | Stop reason: HOSPADM

## 2025-04-02 RX ORDER — BUSPIRONE HYDROCHLORIDE 10 MG/1
10 TABLET ORAL EVERY 12 HOURS
COMMUNITY
Start: 2023-09-25 | End: 2025-04-03 | Stop reason: HOSPADM

## 2025-04-02 RX ORDER — ONDANSETRON HYDROCHLORIDE 2 MG/ML
4 INJECTION, SOLUTION INTRAVENOUS EVERY 8 HOURS PRN
Status: DISCONTINUED | OUTPATIENT
Start: 2025-04-02 | End: 2025-04-03 | Stop reason: HOSPADM

## 2025-04-02 RX ORDER — LEVOTHYROXINE SODIUM 50 UG/1
1 TABLET ORAL DAILY
COMMUNITY
Start: 2018-07-03

## 2025-04-02 RX ORDER — IPRATROPIUM BROMIDE AND ALBUTEROL SULFATE 2.5; .5 MG/3ML; MG/3ML
3 SOLUTION RESPIRATORY (INHALATION)
Status: DISCONTINUED | OUTPATIENT
Start: 2025-04-02 | End: 2025-04-03 | Stop reason: HOSPADM

## 2025-04-02 RX ORDER — TRAZODONE HYDROCHLORIDE 50 MG/1
100 TABLET ORAL DAILY PRN
Status: DISCONTINUED | OUTPATIENT
Start: 2025-04-02 | End: 2025-04-03 | Stop reason: HOSPADM

## 2025-04-02 RX ORDER — CLONAZEPAM 0.5 MG/1
0.5 TABLET ORAL 2 TIMES DAILY
Status: DISCONTINUED | OUTPATIENT
Start: 2025-04-02 | End: 2025-04-03 | Stop reason: HOSPADM

## 2025-04-02 RX ORDER — DICLOFENAC SODIUM 10 MG/G
1 GEL TOPICAL 2 TIMES DAILY
Status: ON HOLD | COMMUNITY
End: 2025-04-02 | Stop reason: ENTERED-IN-ERROR

## 2025-04-02 RX ORDER — LISINOPRIL 40 MG/1
40 TABLET ORAL DAILY
COMMUNITY
End: 2025-04-03 | Stop reason: HOSPADM

## 2025-04-02 RX ORDER — FERROUS SULFATE 325(65) MG
1 TABLET ORAL DAILY
COMMUNITY

## 2025-04-02 RX ORDER — LANOLIN ALCOHOL/MO/W.PET/CERES
800 CREAM (GRAM) TOPICAL DAILY
Status: DISCONTINUED | OUTPATIENT
Start: 2025-04-02 | End: 2025-04-02

## 2025-04-02 RX ORDER — APIXABAN 5 MG/1
5 TABLET, FILM COATED ORAL 2 TIMES DAILY
COMMUNITY

## 2025-04-02 RX ORDER — GABAPENTIN 800 MG/1
800 TABLET ORAL 4 TIMES DAILY
COMMUNITY
End: 2025-04-03 | Stop reason: HOSPADM

## 2025-04-02 RX ORDER — PANTOPRAZOLE SODIUM 40 MG/1
40 TABLET, DELAYED RELEASE ORAL DAILY
COMMUNITY

## 2025-04-02 RX ORDER — MEMANTINE HYDROCHLORIDE 5 MG/1
10 TABLET ORAL DAILY
Status: DISCONTINUED | OUTPATIENT
Start: 2025-04-02 | End: 2025-04-02

## 2025-04-02 RX ORDER — TRAZODONE HYDROCHLORIDE 100 MG/1
200 TABLET ORAL DAILY PRN
COMMUNITY

## 2025-04-02 RX ORDER — PANTOPRAZOLE SODIUM 40 MG/10ML
40 INJECTION, POWDER, LYOPHILIZED, FOR SOLUTION INTRAVENOUS
Status: DISCONTINUED | OUTPATIENT
Start: 2025-04-02 | End: 2025-04-03 | Stop reason: HOSPADM

## 2025-04-02 RX ORDER — BUDESONIDE 0.5 MG/2ML
0.5 INHALANT ORAL
Status: DISCONTINUED | OUTPATIENT
Start: 2025-04-02 | End: 2025-04-03 | Stop reason: HOSPADM

## 2025-04-02 RX ADMIN — BUDESONIDE 0.5 MG: 0.5 INHALANT RESPIRATORY (INHALATION) at 07:42

## 2025-04-02 RX ADMIN — ASPIRIN 81 MG: 81 TABLET, COATED ORAL at 05:08

## 2025-04-02 RX ADMIN — APIXABAN 5 MG: 5 TABLET, FILM COATED ORAL at 09:41

## 2025-04-02 RX ADMIN — PIPERACILLIN SODIUM AND TAZOBACTAM SODIUM 3.38 G: 3; .375 INJECTION, SOLUTION INTRAVENOUS at 09:43

## 2025-04-02 RX ADMIN — SODIUM CHLORIDE 100 ML/HR: 9 INJECTION, SOLUTION INTRAVENOUS at 03:40

## 2025-04-02 RX ADMIN — BUSPIRONE HYDROCHLORIDE 10 MG: 10 TABLET ORAL at 09:41

## 2025-04-02 RX ADMIN — GABAPENTIN 300 MG: 300 CAPSULE ORAL at 22:09

## 2025-04-02 RX ADMIN — Medication 5 L/MIN: at 15:21

## 2025-04-02 RX ADMIN — PIPERACILLIN SODIUM AND TAZOBACTAM SODIUM 3.38 G: 3; .375 INJECTION, SOLUTION INTRAVENOUS at 05:02

## 2025-04-02 RX ADMIN — TRAMADOL HYDROCHLORIDE 50 MG: 50 TABLET, COATED ORAL at 14:59

## 2025-04-02 RX ADMIN — MIDODRINE HYDROCHLORIDE 5 MG: 5 TABLET ORAL at 18:28

## 2025-04-02 RX ADMIN — PANTOPRAZOLE SODIUM 40 MG: 40 TABLET, DELAYED RELEASE ORAL at 09:41

## 2025-04-02 RX ADMIN — IPRATROPIUM BROMIDE AND ALBUTEROL SULFATE 3 ML: 2.5; .5 SOLUTION RESPIRATORY (INHALATION) at 07:42

## 2025-04-02 RX ADMIN — Medication 5 L/MIN: at 11:38

## 2025-04-02 RX ADMIN — DONEPEZIL HYDROCHLORIDE 22.5 MG: 5 TABLET ORAL at 05:07

## 2025-04-02 RX ADMIN — CLONAZEPAM 0.5 MG: 0.5 TABLET ORAL at 09:42

## 2025-04-02 RX ADMIN — ICOSAPENT ETHYL 2 G: 1 CAPSULE ORAL at 16:03

## 2025-04-02 RX ADMIN — FENTANYL 1 PATCH: 50 PATCH TRANSDERMAL at 09:43

## 2025-04-02 RX ADMIN — ATORVASTATIN CALCIUM 20 MG: 20 TABLET, FILM COATED ORAL at 09:41

## 2025-04-02 RX ADMIN — FERROUS SULFATE TAB 325 MG (65 MG ELEMENTAL FE) 325 MG: 325 (65 FE) TAB at 09:42

## 2025-04-02 RX ADMIN — APIXABAN 5 MG: 5 TABLET, FILM COATED ORAL at 22:09

## 2025-04-02 RX ADMIN — PIPERACILLIN SODIUM AND TAZOBACTAM SODIUM 3.38 G: 3; .375 INJECTION, SOLUTION INTRAVENOUS at 15:00

## 2025-04-02 RX ADMIN — MIDODRINE HYDROCHLORIDE 5 MG: 5 TABLET ORAL at 05:08

## 2025-04-02 RX ADMIN — GABAPENTIN 800 MG: 400 CAPSULE ORAL at 05:08

## 2025-04-02 RX ADMIN — Medication 400 MG: at 09:43

## 2025-04-02 RX ADMIN — IPRATROPIUM BROMIDE AND ALBUTEROL SULFATE 3 ML: 2.5; .5 SOLUTION RESPIRATORY (INHALATION) at 11:38

## 2025-04-02 RX ADMIN — ACETAMINOPHEN 650 MG: 325 TABLET, FILM COATED ORAL at 16:03

## 2025-04-02 RX ADMIN — CLONAZEPAM 0.5 MG: 0.5 TABLET ORAL at 22:09

## 2025-04-02 RX ADMIN — VENLAFAXINE HYDROCHLORIDE 150 MG: 75 CAPSULE, EXTENDED RELEASE ORAL at 09:43

## 2025-04-02 RX ADMIN — IPRATROPIUM BROMIDE AND ALBUTEROL SULFATE 3 ML: 2.5; .5 SOLUTION RESPIRATORY (INHALATION) at 15:21

## 2025-04-02 RX ADMIN — PIPERACILLIN SODIUM AND TAZOBACTAM SODIUM 3.38 G: 3; .375 INJECTION, SOLUTION INTRAVENOUS at 22:09

## 2025-04-02 RX ADMIN — ICOSAPENT ETHYL 2 G: 1 CAPSULE ORAL at 09:42

## 2025-04-02 RX ADMIN — DONEPEZIL HYDROCHLORIDE 22.5 MG: 5 TABLET ORAL at 22:09

## 2025-04-02 RX ADMIN — MEMANTINE 10 MG: 5 TABLET ORAL at 05:08

## 2025-04-02 SDOH — ECONOMIC STABILITY: HOUSING INSECURITY: AT ANY TIME IN THE PAST 12 MONTHS, WERE YOU HOMELESS OR LIVING IN A SHELTER (INCLUDING NOW)?: NO

## 2025-04-02 SDOH — ECONOMIC STABILITY: HOUSING INSECURITY: IN THE LAST 12 MONTHS, WAS THERE A TIME WHEN YOU WERE NOT ABLE TO PAY THE MORTGAGE OR RENT ON TIME?: NO

## 2025-04-02 SDOH — ECONOMIC STABILITY: FOOD INSECURITY: WITHIN THE PAST 12 MONTHS, YOU WORRIED THAT YOUR FOOD WOULD RUN OUT BEFORE YOU GOT THE MONEY TO BUY MORE.: NEVER TRUE

## 2025-04-02 SDOH — HEALTH STABILITY: PHYSICAL HEALTH: ON AVERAGE, HOW MANY MINUTES DO YOU ENGAGE IN EXERCISE AT THIS LEVEL?: 10 MIN

## 2025-04-02 SDOH — ECONOMIC STABILITY: FOOD INSECURITY: HOW HARD IS IT FOR YOU TO PAY FOR THE VERY BASICS LIKE FOOD, HOUSING, MEDICAL CARE, AND HEATING?: NOT VERY HARD

## 2025-04-02 SDOH — ECONOMIC STABILITY: HOUSING INSECURITY: IN THE PAST 12 MONTHS, HOW MANY TIMES HAVE YOU MOVED WHERE YOU WERE LIVING?: 0

## 2025-04-02 SDOH — SOCIAL STABILITY: SOCIAL INSECURITY
WITHIN THE LAST YEAR, HAVE YOU BEEN RAPED OR FORCED TO HAVE ANY KIND OF SEXUAL ACTIVITY BY YOUR PARTNER OR EX-PARTNER?: NO

## 2025-04-02 SDOH — ECONOMIC STABILITY: FOOD INSECURITY: WITHIN THE PAST 12 MONTHS, THE FOOD YOU BOUGHT JUST DIDN'T LAST AND YOU DIDN'T HAVE MONEY TO GET MORE.: NEVER TRUE

## 2025-04-02 SDOH — SOCIAL STABILITY: SOCIAL INSECURITY: DO YOU FEEL UNSAFE GOING BACK TO THE PLACE WHERE YOU ARE LIVING?: NO

## 2025-04-02 SDOH — SOCIAL STABILITY: SOCIAL INSECURITY: WITHIN THE LAST YEAR, HAVE YOU BEEN AFRAID OF YOUR PARTNER OR EX-PARTNER?: NO

## 2025-04-02 SDOH — ECONOMIC STABILITY: TRANSPORTATION INSECURITY: IN THE PAST 12 MONTHS, HAS LACK OF TRANSPORTATION KEPT YOU FROM MEDICAL APPOINTMENTS OR FROM GETTING MEDICATIONS?: NO

## 2025-04-02 SDOH — HEALTH STABILITY: PHYSICAL HEALTH: ON AVERAGE, HOW MANY MINUTES DO YOU ENGAGE IN EXERCISE AT THIS LEVEL?: 0 MIN

## 2025-04-02 SDOH — HEALTH STABILITY: PHYSICAL HEALTH: ON AVERAGE, HOW MANY DAYS PER WEEK DO YOU ENGAGE IN MODERATE TO STRENUOUS EXERCISE (LIKE A BRISK WALK)?: 7 DAYS

## 2025-04-02 SDOH — SOCIAL STABILITY: SOCIAL INSECURITY: HAVE YOU HAD ANY THOUGHTS OF HARMING ANYONE ELSE?: NO

## 2025-04-02 SDOH — SOCIAL STABILITY: SOCIAL INSECURITY: ARE YOU OR HAVE YOU BEEN THREATENED OR ABUSED PHYSICALLY, EMOTIONALLY, OR SEXUALLY BY ANYONE?: NO

## 2025-04-02 SDOH — SOCIAL STABILITY: SOCIAL INSECURITY
WITHIN THE LAST YEAR, HAVE YOU BEEN KICKED, HIT, SLAPPED, OR OTHERWISE PHYSICALLY HURT BY YOUR PARTNER OR EX-PARTNER?: NO

## 2025-04-02 SDOH — SOCIAL STABILITY: SOCIAL INSECURITY: WITHIN THE LAST YEAR, HAVE YOU BEEN HUMILIATED OR EMOTIONALLY ABUSED IN OTHER WAYS BY YOUR PARTNER OR EX-PARTNER?: NO

## 2025-04-02 SDOH — SOCIAL STABILITY: SOCIAL INSECURITY: HAS ANYONE EVER THREATENED TO HURT YOUR FAMILY OR YOUR PETS?: NO

## 2025-04-02 SDOH — ECONOMIC STABILITY: INCOME INSECURITY: IN THE PAST 12 MONTHS HAS THE ELECTRIC, GAS, OIL, OR WATER COMPANY THREATENED TO SHUT OFF SERVICES IN YOUR HOME?: NO

## 2025-04-02 SDOH — SOCIAL STABILITY: SOCIAL INSECURITY: ABUSE: ADULT

## 2025-04-02 SDOH — SOCIAL STABILITY: SOCIAL INSECURITY: WERE YOU ABLE TO COMPLETE ALL THE BEHAVIORAL HEALTH SCREENINGS?: YES

## 2025-04-02 SDOH — SOCIAL STABILITY: SOCIAL INSECURITY: DO YOU FEEL ANYONE HAS EXPLOITED OR TAKEN ADVANTAGE OF YOU FINANCIALLY OR OF YOUR PERSONAL PROPERTY?: NO

## 2025-04-02 SDOH — SOCIAL STABILITY: SOCIAL INSECURITY: DOES ANYONE TRY TO KEEP YOU FROM HAVING/CONTACTING OTHER FRIENDS OR DOING THINGS OUTSIDE YOUR HOME?: NO

## 2025-04-02 SDOH — HEALTH STABILITY: PHYSICAL HEALTH: ON AVERAGE, HOW MANY DAYS PER WEEK DO YOU ENGAGE IN MODERATE TO STRENUOUS EXERCISE (LIKE A BRISK WALK)?: 0 DAYS

## 2025-04-02 SDOH — SOCIAL STABILITY: SOCIAL INSECURITY: ARE THERE ANY APPARENT SIGNS OF INJURIES/BEHAVIORS THAT COULD BE RELATED TO ABUSE/NEGLECT?: NO

## 2025-04-02 SDOH — SOCIAL STABILITY: SOCIAL INSECURITY: HAVE YOU HAD THOUGHTS OF HARMING ANYONE ELSE?: NO

## 2025-04-02 SDOH — ECONOMIC STABILITY: FOOD INSECURITY: HOW HARD IS IT FOR YOU TO PAY FOR THE VERY BASICS LIKE FOOD, HOUSING, MEDICAL CARE, AND HEATING?: NOT HARD AT ALL

## 2025-04-02 ASSESSMENT — ENCOUNTER SYMPTOMS
HEMATOLOGIC/LYMPHATIC NEGATIVE: 1
GASTROINTESTINAL NEGATIVE: 1
EYES NEGATIVE: 1
ACTIVITY CHANGE: 1
CONFUSION: 1
CARDIOVASCULAR NEGATIVE: 1
ENDOCRINE NEGATIVE: 1
SPEECH DIFFICULTY: 1
ALLERGIC/IMMUNOLOGIC NEGATIVE: 1
MUSCULOSKELETAL NEGATIVE: 1
DECREASED CONCENTRATION: 1
FATIGUE: 1
APPETITE CHANGE: 1
RESPIRATORY NEGATIVE: 1
AGITATION: 1

## 2025-04-02 ASSESSMENT — PATIENT HEALTH QUESTIONNAIRE - PHQ9
2. FEELING DOWN, DEPRESSED OR HOPELESS: NOT AT ALL
1. LITTLE INTEREST OR PLEASURE IN DOING THINGS: NOT AT ALL
SUM OF ALL RESPONSES TO PHQ9 QUESTIONS 1 & 2: 0

## 2025-04-02 ASSESSMENT — COGNITIVE AND FUNCTIONAL STATUS - GENERAL
CLIMB 3 TO 5 STEPS WITH RAILING: A LITTLE
DAILY ACTIVITIY SCORE: 21
DRESSING REGULAR LOWER BODY CLOTHING: A LITTLE
STANDING UP FROM CHAIR USING ARMS: A LITTLE
MOVING FROM LYING ON BACK TO SITTING ON SIDE OF FLAT BED WITH BEDRAILS: A LITTLE
MOBILITY SCORE: 18
DAILY ACTIVITIY SCORE: 24
WALKING IN HOSPITAL ROOM: A LITTLE
MOBILITY SCORE: 18
CLIMB 3 TO 5 STEPS WITH RAILING: A LITTLE
DRESSING REGULAR LOWER BODY CLOTHING: A LITTLE
DRESSING REGULAR UPPER BODY CLOTHING: A LITTLE
PATIENT BASELINE BEDBOUND: NO
DAILY ACTIVITIY SCORE: 23
MOVING TO AND FROM BED TO CHAIR: A LITTLE
TURNING FROM BACK TO SIDE WHILE IN FLAT BAD: A LITTLE
MOVING TO AND FROM BED TO CHAIR: A LITTLE
TURNING FROM BACK TO SIDE WHILE IN FLAT BAD: A LITTLE
STANDING UP FROM CHAIR USING ARMS: A LITTLE
MOVING FROM LYING ON BACK TO SITTING ON SIDE OF FLAT BED WITH BEDRAILS: A LITTLE
WALKING IN HOSPITAL ROOM: A LITTLE
TOILETING: A LITTLE

## 2025-04-02 ASSESSMENT — ACTIVITIES OF DAILY LIVING (ADL)
FEEDING YOURSELF: INDEPENDENT
TOILETING: INDEPENDENT
LACK_OF_TRANSPORTATION: NO
WALKS IN HOME: INDEPENDENT
ADEQUATE_TO_COMPLETE_ADL: YES
ASSISTIVE_DEVICE: CANE;WALKER
BATHING: INDEPENDENT
GROOMING: INDEPENDENT
JUDGMENT_ADEQUATE_SAFELY_COMPLETE_DAILY_ACTIVITIES: YES
HEARING - RIGHT EAR: HEARING AID
LACK_OF_TRANSPORTATION: NO
PATIENT'S MEMORY ADEQUATE TO SAFELY COMPLETE DAILY ACTIVITIES?: YES
DRESSING YOURSELF: INDEPENDENT
LACK_OF_TRANSPORTATION: NO
HEARING - LEFT EAR: HEARING AID

## 2025-04-02 ASSESSMENT — LIFESTYLE VARIABLES
AUDIT-C TOTAL SCORE: 5
HOW OFTEN DO YOU HAVE A DRINK CONTAINING ALCOHOL: 2-3 TIMES A WEEK
HOW OFTEN DO YOU HAVE 6 OR MORE DRINKS ON ONE OCCASION: MONTHLY
AUDIT-C TOTAL SCORE: 5
SKIP TO QUESTIONS 9-10: 0
HOW MANY STANDARD DRINKS CONTAINING ALCOHOL DO YOU HAVE ON A TYPICAL DAY: 1 OR 2

## 2025-04-02 ASSESSMENT — PAIN SCALES - GENERAL
PAINLEVEL_OUTOF10: 0 - NO PAIN
PAINLEVEL_OUTOF10: 0 - NO PAIN

## 2025-04-02 NOTE — ED PROVIDER NOTES
HPI   Chief Complaint   Patient presents with    Fatigue    Weakness, Gen    Tremors       This is a 79-year-old female who presents to the emergency department with altered mental status.  History from the patient and the daughter at the bedside.  The patient was normal this morning at 630.  At 11 AM she woke from sleep and was having shaking.  The patient's speech also seems to be slow the patient took her usual medications and then went back to sleep she woke again around 330 and her symptoms seem to be worse.  The patient denies headache and chest pain.  She denies fever.  She reports shortness of breath which is chronic.    Past medical history: COPD on home O2, hypertension, GERD, depression/anxiety, CKD, PVD, TIA, alcohol use disorder              Patient History   Past Medical History:   Diagnosis Date    Personal history of transient ischemic attack (TIA), and cerebral infarction without residual deficits 07/03/2018    H/O TIA (transient ischemic attack) and stroke    Personal history of urinary calculi 07/03/2018    History of kidney stones     Past Surgical History:   Procedure Laterality Date    APPENDECTOMY  07/03/2018    Appendectomy    CATARACT EXTRACTION  07/03/2018    Cataract Surgery    OTHER SURGICAL HISTORY  07/03/2018    Ovarian Cystectomy     No family history on file.  Social History     Tobacco Use    Smoking status: Former     Types: Cigarettes    Smokeless tobacco: Never    Tobacco comments:     Quit 6 years ago   Substance Use Topics    Alcohol use: Never    Drug use: Never       Physical Exam   ED Triage Vitals [04/01/25 1708]   Temperature Heart Rate Respirations BP   36.8 °C (98.3 °F) 60 16 85/65      Pulse Ox Temp Source Heart Rate Source Patient Position   96 % Temporal Monitor Lying      BP Location FiO2 (%)     Right arm --       Physical Exam  Vitals and nursing note reviewed.   HENT:      Head: Normocephalic and atraumatic.      Nose: Nose normal.   Eyes:      Conjunctiva/sclera:  Conjunctivae normal.   Cardiovascular:      Rate and Rhythm: Normal rate and regular rhythm.      Pulses: Normal pulses.      Heart sounds: Normal heart sounds.   Pulmonary:      Effort: Pulmonary effort is normal.      Breath sounds: Decreased breath sounds and wheezing present.   Abdominal:      General: Bowel sounds are normal.      Palpations: Abdomen is soft.   Musculoskeletal:         General: Normal range of motion.      Cervical back: Normal range of motion and neck supple.   Skin:     Findings: No rash.   Neurological:      General: No focal deficit present.      Mental Status: She is alert and oriented to person, place, and time.      Motor: Weakness present.   Psychiatric:         Mood and Affect: Mood normal.           ED Course & MDM   Diagnoses as of 04/01/25 2235   Altered mental status, unspecified altered mental status type   Respiratory acidosis                 No data recorded     Varsha Coma Scale Score: 15 (04/01/25 1717 : Ramila Coombs RN)                           Medical Decision Making  Differential diagnosis: I have considered the following conditions in my assessment of this patient's condition: Delirium, alcohol intoxication, CVA, hepatic encephalopathy, encephalitis, hyponatremia, intracranial hemorrhage, hypoglycemia, meningitis, overdose, seizure and postictal state, sepsis.    This is a 79-year-old female who presents to the emergency department complaining of altered mental status.  Patient's blood pressure is low.  She was treated for sepsis with 30 cc/kg fluid bolus and broad-spectrum antibiotics.  SEP-1 CORE MEASURE DATA      I suspected infection with no organ dysfunction on 4/1/2025  6:53 PM.    BP 85/68 (BP Location: Left arm, Patient Position: Lying)   Pulse 60   Temp 36.8 °C (98.3 °F) (Temporal)   Resp 18   SpO2 95%      Lab Results       Component                Value               Date/Time                  WBC                      8.3                  04/01/2025 1826            Lactate                  1.7                 04/01/2025 1826            POCT Lactate, Venous     1.7                 04/01/2025 1826            Creatinine               1.55 (H)            04/01/2025 1826            Bilirubin, Total         0.3                 04/01/2025 1826            Platelets                192                 04/01/2025 1826            POCT pH, Venous          7.30 (L)            04/01/2025 1826            POCT Base Excess, V*     3.5 (H)             04/01/2025 1826            Glucose                  79                  04/01/2025 1826            POCT Glucose             85                  04/01/2025 1704            POCT Glucose, Venous     79                  04/01/2025 1826          A targeted fluid bolus of at least 30ml/kg based on entered actual body weight at time of triage was given.    Suspected infection source   Pulmonary    Patient recently received an antibiotic (last 24 hours)     Date/Time Action Medication Dose Rate    04/01/25 2045 New Bag    vancomycin (Vancocin) 1.5 g in dextrose 5% 500 mL IV 1.5 g 353.3 mL/hr    04/01/25 1959 New Bag    piperacillin-tazobactam (Zosyn) 3.375 g in dextrose (iso) IV 50 mL 3.375   g         Medical decision making/complexity  The patient's white blood count and lactate were normal.  Chest x-ray shows a question of a effusion or small infiltrate.  The patient's blood gas showed a respiratory acidosis.  Patient was treated with Solu-Medrol and DuoNebs.  The patient's systolic blood pressure remained low despite fluids although her map remained above 65 throughout her stay in the emergency department.  She requires admission for further evaluation and management.      I performed a sepsis reperfusion exam on Rosa Maria Jason on 04/01/25 at 10:00 PM       Amount and/or Complexity of Data Reviewed  ECG/medicine tests: independent interpretation performed.     Details: Normal sinus rhythm, heart rate 58, no ST  elevation        Procedure  Procedures     Zeyad Acuña MD  04/01/25 7697

## 2025-04-02 NOTE — PROGRESS NOTES
Pharmacy Medication History     Source of Information: daughter    Additional concerns with the patient's PTA list.   Due to change fentanyl patch today  Notified Provider via Haiku : No    The following updates were made to the Prior to Admission medication list:     Medications ADDED:   Pantoprazole  Thiamine  Robitussin  levothyroxine  Medications CHANGED:  N/a  Medications REMOVED:   Abilify  Medications NOT TAKING:   Abilify    Allergy reviewed : Yes    Meds 2 Beds : No    Outpatient pharmacy confirmed and updated in chart : Yes    Pharmacy name: [facility] [place]    The list below reflectives the updated PTA list. Please review each medication in order reconciliation for additional clarification and justification.    Prior to Admission Medications   Prescriptions Last Dose Informant   Eliquis 5 mg tablet Unknown Child   Sig: Take 1 tablet (5 mg) by mouth 2 times a day.   Valarie-Tussin DM  mg/5 mL oral liquid  Child   Sig: TAKE 10 MILLILITERS EVERY 4 HOURS AS NEEDED FOR COUGH   amLODIPine-benazepriL (Lotrel) 10-20 mg capsule Unknown Child   Sig: Take 1 capsule by mouth once daily.   aspirin 81 mg EC tablet Unknown Child   Sig: Take 1 tablet (81 mg) by mouth once every 24 hours.   atorvastatin (Lipitor) 20 mg tablet Unknown Child   Sig: TAKE 1 TABLET BY MOUTH AT BEDTIME   budesonide-glycopyr-formoterol (Breztri Aerosphere) 160-9-4.8 mcg/actuation HFA aerosol inhaler Unknown Child   Sig: Inhale 2 puffs 2 times a day.  rinse mouth and throat after use   busPIRone (Buspar) 10 mg tablet Unknown Child   Sig: Take 1 tablet (10 mg) by mouth every 12 hours.   carisoprodol (Soma) 350 mg tablet Unknown Child   Sig: Take 1 tablet (350 mg) by mouth 3 times a day as needed for muscle spasms.   clonazePAM (KlonoPIN) 0.5 mg tablet Unknown Child   Si tablet (0.5 mg) 2 times a day as needed for anxiety.   donepezil (Aricept) 5 mg tablet Unknown Child   Sig: Take 1 tablet (5 mg) by mouth once daily at bedtime.   fentaNYL  (Duragesic) 50 mcg/hr patch Unknown Child   Sig: Place 1 patch on the skin every 3rd day.   ferrous sulfate 325 (65 Fe) mg tablet Unknown Child   Sig: Take 1 tablet (325 mg) by mouth once daily.   gabapentin (Neurontin) 800 mg tablet Unknown Child   Sig: Take 1 tablet (800 mg) by mouth 4 times a day.   icosapent ethyL (Vascepa) 1 gram capsule Unknown Child   Sig: Take 1 capsule (1 g) by mouth 2 times daily (morning and late afternoon).   lemborexant (DayVigo) 5 mg tablet Unknown Child   Sig: Take 1 tablet (5 mg) by mouth once daily at bedtime.   levothyroxine (Synthroid, Levoxyl) 50 mcg tablet Unknown Child   Sig: Take 1 tablet (50 mcg) by mouth once daily.   lisinopril 40 mg tablet Unknown Child   Sig: Take 1 tablet (40 mg) by mouth once daily.   magnesium oxide (Mag-Ox) 400 mg (241.3 mg magnesium) tablet Unknown Child   Sig: Take 1 tablet (400 mg) by mouth once daily.   memantine (Namenda) 10 mg tablet Unknown Child   Sig: Take 1 tablet (10 mg) by mouth once daily.   metoprolol ta-hydrochlorothiaz (Lopressor HCT) 50-25 mg tablet Unknown Child   Sig: Take 1 tablet by mouth once daily.   multivitamin tablet Unknown Child   Sig: Take 1 tablet by mouth once daily.   pantoprazole (ProtoNix) 40 mg EC tablet Unknown Child   Sig: Take 1 tablet (40 mg) by mouth once daily.   potassium chloride CR 20 mEq ER tablet Unknown Child   Sig: Take 1 tablet (20 mEq) by mouth once daily.   thiamine 100 mg tablet Unknown Child   Sig: Take 1 tablet (100 mg) by mouth once daily.   traMADol (Ultram) 50 mg tablet Unknown Child   Sig: Take 1 tablet (50 mg) by mouth every 12 hours if needed for moderate pain (4 - 6).   traZODone (Desyrel) 100 mg tablet Unknown Child   Sig: Take 2 tablets (200 mg) by mouth once daily as needed for sleep.   venlafaxine XR (Effexor-XR) 150 mg 24 hr capsule Unknown Child   Sig: Take 1 capsule (150 mg) by mouth 3 times a day after meals.      Facility-Administered Medications: None       The list below  reflectives the updated allergy list. Please review each documented allergy for additional clarification and justification.    No Known Allergies       04/02/25 at 10:58 AM - Tomasa Waggoner

## 2025-04-02 NOTE — PROGRESS NOTES
Vancomycin Dosing by Pharmacy- INITIAL    Rosa Maria Jason is a 79 y.o. year old female who Pharmacy has been consulted for vancomycin dosing for pneumonia. Based on the patient's indication and renal status this patient will be dosed based on a goal trough/random level of 15-20.     Renal function is currently declining.    Visit Vitals  /61 (BP Location: Left arm, Patient Position: Lying)   Pulse 74   Temp 36.7 °C (98 °F) (Oral)   Resp 17        Lab Results   Component Value Date    CREATININE 1.55 (H) 2025    CREATININE CANCELED 2023    CREATININE 0.90 2023    CREATININE 1.46 (H) 2023    CREATININE 1.75 (H) 04/15/2023        Patient weight is as follows:   Vitals:    25 1708   Weight: 79.2 kg (174 lb 9.7 oz)       Cultures:  No results found for the encounter in last 14 days.        No intake/output data recorded.  I/O during current shift:  I/O this shift:  In: 1550 [IV Piggyback:1550]  Out: -     Temp (24hrs), Av.7 °C (98.1 °F), Min:36.7 °C (98 °F), Max:36.8 °C (98.3 °F)         Assessment/Plan     Patient has already been given a loading dose of 1,500 mg.  Will proceed with dosing by levels at this time.  Follow-up level will be ordered on  at 18:00 unless clinically indicated sooner.  Will continue to monitor renal function daily while on vancomycin and order serum creatinine at least every 48 hours if not already ordered.  Follow for continued vancomycin needs, clinical response, and signs/symptoms of toxicity.       Lupe Ziegler, TiaD

## 2025-04-02 NOTE — PROGRESS NOTES
04/02/25 1358   [facility] Disability Status   Are you deaf or do you have serious difficulty hearing? N   Are you blind or do you have serious difficulty seeing, even when wearing glasses? N   Because of a physical, mental, or emotional condition, do you have serious difficulty concentrating, remembering, or making decisions? (5 years old or older) N   Do you have serious difficulty walking or climbing stairs? Y   Do you have serious difficulty dressing or bathing? N   Because of a physical, mental, or emotional condition, do you have serious difficulty doing errands alone such as visiting the doctor? N

## 2025-04-02 NOTE — CARE PLAN
The patient's goals for the shift include      The clinical goals for the shift include Pt will remain safe during shift      Problem: Pain - Adult  Goal: Verbalizes/displays adequate comfort level or baseline comfort level  Outcome: Progressing     Problem: Safety - Adult  Goal: Free from fall injury  Outcome: Progressing     Problem: Discharge Planning  Goal: Discharge to home or other facility with appropriate resources  Outcome: Progressing     Problem: Chronic Conditions and Co-morbidities  Goal: Patient's chronic conditions and co-morbidity symptoms are monitored and maintained or improved  Outcome: Progressing     Problem: Nutrition  Goal: Nutrient intake appropriate for maintaining nutritional needs  Outcome: Progressing     Problem: Fall/Injury  Goal: Not fall by end of shift  Outcome: Progressing  Goal: Be free from injury by end of the shift  Outcome: Progressing  Goal: Verbalize understanding of personal risk factors for fall in the hospital  Outcome: Progressing  Goal: Verbalize understanding of risk factor reduction measures to prevent injury from fall in the home  Outcome: Progressing  Goal: Use assistive devices by end of the shift  Outcome: Progressing  Goal: Pace activities to prevent fatigue by end of the shift  Outcome: Progressing     Problem: Pain  Goal: Takes deep breaths with improved pain control throughout the shift  Outcome: Progressing  Goal: Turns in bed with improved pain control throughout the shift  Outcome: Progressing  Goal: Walks with improved pain control throughout the shift  Outcome: Progressing  Goal: Performs ADL's with improved pain control throughout shift  Outcome: Progressing  Goal: Participates in PT with improved pain control throughout the shift  Outcome: Progressing  Goal: Free from opioid side effects throughout the shift  Outcome: Progressing  Goal: Free from acute confusion related to pain meds throughout the shift  Outcome: Progressing

## 2025-04-02 NOTE — PROGRESS NOTES
04/02/25 1358   Discharge Planning   Living Arrangements Children   Support Systems Children   Assistance Needed Independent, if needs daughter helps   Type of Residence Private residence   Number of Stairs to Enter Residence 4   Number of Stairs Within Residence 17  (has chair lift)   Do you have animals or pets at home? Yes   Type of Animals or Pets one dog   Who is requesting discharge planning? Provider   Home or Post Acute Services None   Expected Discharge Disposition Home   Does the patient need discharge transport arranged? Yes   RoundTrip coordination needed? Yes   Has discharge transport been arranged? No   Financial Resource Strain   How hard is it for you to pay for the very basics like food, housing, medical care, and heating? Not hard   Housing Stability   In the last 12 months, was there a time when you were not able to pay the mortgage or rent on time? N   In the past 12 months, how many times have you moved where you were living? 0   At any time in the past 12 months, were you homeless or living in a shelter (including now)? N   Transportation Needs   In the past 12 months, has lack of transportation kept you from medical appointments or from getting medications? no   In the past 12 months, has lack of transportation kept you from meetings, work, or from getting things needed for daily living? No   Patient Choice   Provider Choice list and CMS website (https://medicare.gov/care-compare#search) for post-acute Quality and Resource Measure Data were provided and reviewed with: Patient   Patient / Family choosing to utilize agency / facility established prior to hospitalization No   Stroke Family Assessment   Stroke Family Assessment Needed No   Intensity of Service   Intensity of Service 0-30 min         Met with patient at bedside and explained my role as care coordinator. She lives in the house with her daughter and an adult grandson. She has chair lift for upstairs. She is independent with her  care but stated daughter helps if she needs it. She uses a cane and a walker. Patient uses oxygen  at home and she is on 5L NC and her supplier is [employer]. No HD. Patient stated she has PCP but couldn't remember his name. Pharmacy she uses is [facility] in [place] on [address] She is able to afford medications and toget to her doctors appointments. Her daughter drives her. Offered HHC to patient and she declined.   PAST MEDICAL HISTORY:  COPD (Chronic Obstructive Pulmonary Disease) no intubation hx  last exacerb 11/2016  PRN 2 L NC    GERD (gastroesophageal reflux disease)     Raynauds disease

## 2025-04-02 NOTE — H&P
History Of Present Illness  Rosa Maria Jason is a 79 y.o. female who presented to the emergency department for altered mental status.  Apparently the patient's vital signs was normal yesterday morning at around 6:30 AM.  She then took a nap and at 11 AM she woke up and was shaking and had slow speech and took her usual medications and then went back to sleep.  She then woke up around 1:30 PM and her symptoms were worse prompting her to come to the ER here at [facility].  She denies any headache chest pain fever chills abdominal pain nausea vomiting or diarrhea.  She has COPD and is on home oxygen at 5 L/min continuous.  She also has GERD hypertension depression/anxiety, chronic pain, CKD 3, PVD, TIA, alcohol use disorder.  She was treated in the ER with IV fluids and broad-spectrum antibiotics.  Her white count was normal and her lactate is normal but her blood pressure was low.  Her chest x-ray showed small pleural effusions versus Voltaren.  She is being admitted to the stepdown unit    Past Medical History  Past Medical History:   Diagnosis Date    Personal history of transient ischemic attack (TIA), and cerebral infarction without residual deficits 07/03/2018    H/O TIA (transient ischemic attack) and stroke    Personal history of urinary calculi 07/03/2018    History of kidney stones        Surgical History  Past Surgical History:   Procedure Laterality Date    APPENDECTOMY  07/03/2018    Appendectomy    CATARACT EXTRACTION  07/03/2018    Cataract Surgery    OTHER SURGICAL HISTORY  07/03/2018    Ovarian Cystectomy         Social History  She reports that she has quit smoking. Her smoking use included cigarettes. She has never used smokeless tobacco. She reports that she does not drink alcohol and does not use drugs.    Family History  No family history on file.     Allergies  Aspirin and Codeine    Review of Systems   Constitutional:  Positive for activity change, appetite change and fatigue.   HENT:  "Negative.     Eyes: Negative.    Respiratory: Negative.     Cardiovascular: Negative.    Gastrointestinal: Negative.    Endocrine: Negative.    Genitourinary: Negative.    Musculoskeletal: Negative.    Skin: Negative.    Allergic/Immunologic: Negative.    Neurological:  Positive for speech difficulty.        Shaking   Hematological: Negative.    Psychiatric/Behavioral:  Positive for agitation, behavioral problems, confusion and decreased concentration.    All other systems reviewed and are negative.       Physical Exam  Vitals and nursing note reviewed.   Constitutional:       Appearance: Normal appearance. She is ill-appearing.   HENT:      Head: Normocephalic.      Right Ear: External ear normal.      Left Ear: External ear normal.      Nose: Nose normal.      Mouth/Throat:      Mouth: Mucous membranes are dry.      Pharynx: Oropharynx is clear.   Eyes:      Extraocular Movements: Extraocular movements intact.      Conjunctiva/sclera: Conjunctivae normal.      Pupils: Pupils are equal, round, and reactive to light.   Cardiovascular:      Rate and Rhythm: Normal rate and regular rhythm.   Pulmonary:      Effort: Pulmonary effort is normal.      Breath sounds: Normal breath sounds.   Abdominal:      General: Abdomen is flat. Bowel sounds are normal.      Palpations: Abdomen is soft.   Musculoskeletal:         General: Normal range of motion.   Skin:     General: Skin is warm and dry.   Neurological:      General: No focal deficit present.      Mental Status: She is alert.      Comments: Confused   Psychiatric:         Mood and Affect: Mood normal.         Behavior: Behavior normal.          Last Recorded Vitals  Blood pressure 93/68, pulse 77, temperature 36.7 °C (98 °F), temperature source Temporal, resp. rate 20, height 1.448 m (4' 9\"), weight 79.2 kg (174 lb 9.7 oz), SpO2 (!) 93%.    Relevant Results  Meds:  Scheduled medications  apixaban, 5 mg, oral, BID  aspirin, 81 mg, oral, q24h  atorvastatin, 20 mg, oral, " Daily  busPIRone, 10 mg, oral, q12h  clonazePAM, 0.5 mg, oral, BID  donepezil, 5 mg, oral, Nightly  fentaNYL, 1 patch, transdermal, q72h  ferrous sulfate, 1 tablet, oral, Daily  tiotropium, 2 puff, inhalation, Daily   And  fluticasone furoate-vilanteroL, 1 puff, inhalation, Daily  gabapentin, 800 mg, oral, 4x daily  icosapent ethyL, 1 g, oral, BID  magnesium oxide, 800 mg, oral, Daily  memantine, 10 mg, oral, Daily  midodrine, 5 mg, oral, q8h  pantoprazole, 40 mg, oral, Daily before breakfast   Or  pantoprazole, 40 mg, intravenous, Daily before breakfast  venlafaxine XR, 150 mg, oral, TID after meals      Continuous medications  sodium chloride 0.9%, 100 mL/hr      PRN medications  PRN medications: acetaminophen **OR** acetaminophen **OR** acetaminophen, ondansetron **OR** ondansetron, traMADol, traZODone   Current Outpatient Medications   Medication Instructions    amLODIPine-benazepriL (Lotrel) 10-20 mg capsule 1 capsule, Daily    ARIPiprazole (ABILIFY) 5 mg, oral, Daily    aspirin 81 mg, Every 24 hours    atorvastatin (Lipitor) 20 mg tablet 1 tablet, oral, Daily    atorvastatin (LIPITOR) 20 mg, oral, Nightly    budesonide-glycopyr-formoterol (Breztri Aerosphere) 160-9-4.8 mcg/actuation HFA aerosol inhaler 2 puffs, 2 times daily RT    busPIRone (BUSPAR) 10 mg, Every 12 hours    carisoprodol (SOMA) 350 mg, oral, 3 times daily PRN    clonazePAM (KLONOPIN) 0.5 mg, 2 times daily PRN    diclofenac sodium (Voltaren) 1 % gel 1 Application, Topical, 2 times daily, apply to affected area twice a day as directed    donepezil (ARICEPT) 5 mg, oral, Nightly    Eliquis 5 mg, oral, 2 times daily    fentaNYL (Duragesic) 50 mcg/hr patch 1 patch, Every 72 hours    ferrous sulfate 325 (65 Fe) mg tablet 1 tablet, oral, Daily    gabapentin (NEURONTIN) 800 mg, oral, 4 times daily    icosapent ethyL (VASCEPA) 1 g, oral, 2 times daily (morning and late afternoon)    lemborexant (DAYVIGO) 5 mg, oral, Nightly    lisinopril 40 mg, oral,  Daily    magnesium oxide (Mag-Ox) 400 mg (241.3 mg magnesium) tablet 1 tablet, oral, Daily    memantine (NAMENDA) 10 mg, Daily    metoprolol ta-hydrochlorothiaz (Lopressor HCT) 50-25 mg tablet 1 tablet, Daily    multivitamin tablet 1 tablet, oral, Daily    potassium chloride CR 20 mEq ER tablet 20 mEq, oral, Daily    traMADol (ULTRAM) 50 mg, Every 12 hours PRN    traZODone (DESYREL) 200 mg, oral, Daily PRN    venlafaxine XR (EFFEXOR-XR) 150 mg, oral, 3 times daily after meals        Labs:  Results for orders placed or performed during the hospital encounter of 04/01/25 (from the past 24 hours)   POCT GLUCOSE   Result Value Ref Range    POCT Glucose 85 74 - 99 mg/dL   CBC and Auto Differential   Result Value Ref Range    WBC 8.3 4.4 - 11.3 x10*3/uL    nRBC 0.0 0.0 - 0.0 /100 WBCs    RBC 2.84 (L) 4.00 - 5.20 x10*6/uL    Hemoglobin 9.4 (L) 12.0 - 16.0 g/dL    Hematocrit 30.1 (L) 36.0 - 46.0 %     (H) 80 - 100 fL    MCH 33.1 26.0 - 34.0 pg    MCHC 31.2 (L) 32.0 - 36.0 g/dL    RDW 15.5 (H) 11.5 - 14.5 %    Platelets 192 150 - 450 x10*3/uL    Neutrophils % 59.1 40.0 - 80.0 %    Immature Granulocytes %, Automated 0.4 0.0 - 0.9 %    Lymphocytes % 26.0 13.0 - 44.0 %    Monocytes % 11.6 2.0 - 10.0 %    Eosinophils % 2.4 0.0 - 6.0 %    Basophils % 0.5 0.0 - 2.0 %    Neutrophils Absolute 4.90 1.60 - 5.50 x10*3/uL    Immature Granulocytes Absolute, Automated 0.03 0.00 - 0.50 x10*3/uL    Lymphocytes Absolute 2.15 0.80 - 3.00 x10*3/uL    Monocytes Absolute 0.96 (H) 0.05 - 0.80 x10*3/uL    Eosinophils Absolute 0.20 0.00 - 0.40 x10*3/uL    Basophils Absolute 0.04 0.00 - 0.10 x10*3/uL   Comprehensive metabolic panel   Result Value Ref Range    Glucose 79 74 - 99 mg/dL    Sodium 141 136 - 145 mmol/L    Potassium 3.9 3.5 - 5.3 mmol/L    Chloride 107 98 - 107 mmol/L    Bicarbonate 28 21 - 32 mmol/L    Anion Gap 10 10 - 20 mmol/L    Urea Nitrogen 20 6 - 23 mg/dL    Creatinine 1.55 (H) 0.50 - 1.05 mg/dL    eGFR 34 (L) >60  mL/min/1.73m*2    Calcium 7.6 (L) 8.6 - 10.3 mg/dL    Albumin 3.4 3.4 - 5.0 g/dL    Alkaline Phosphatase 42 33 - 136 U/L    Total Protein 5.4 (L) 6.4 - 8.2 g/dL    AST 15 9 - 39 U/L    Bilirubin, Total 0.3 0.0 - 1.2 mg/dL    ALT 10 7 - 45 U/L   Lactate   Result Value Ref Range    Lactate 1.7 0.4 - 2.0 mmol/L   BLOOD GAS VENOUS FULL PANEL   Result Value Ref Range    POCT pH, Venous 7.30 (L) 7.33 - 7.43 pH    POCT pCO2, Venous 63 (H) 41 - 51 mm Hg    POCT pO2, Venous 49 (H) 35 - 45 mm Hg    POCT SO2, Venous 76 (H) 45 - 75 %    POCT Oxy Hemoglobin, Venous 75.2 (H) 45.0 - 75.0 %    POCT Hematocrit Calculated, Venous 29.0 (L) 36.0 - 46.0 %    POCT Sodium, Venous 138 136 - 145 mmol/L    POCT Potassium, Venous 3.9 3.5 - 5.3 mmol/L    POCT Chloride, Venous 106 98 - 107 mmol/L    POCT Ionized Calicum, Venous 1.12 1.10 - 1.33 mmol/L    POCT Glucose, Venous 79 74 - 99 mg/dL    POCT Lactate, Venous 1.7 0.4 - 2.0 mmol/L    POCT Base Excess, Venous 3.5 (H) -2.0 - 3.0 mmol/L    POCT HCO3 Calculated, Venous 31.0 (H) 22.0 - 26.0 mmol/L    POCT Hemoglobin, Venous 9.7 (L) 12.0 - 16.0 g/dL    POCT Anion Gap, Venous 5.0 (L) 10.0 - 25.0 mmol/L    Patient Temperature 37.0 degrees Celsius    FiO2 40 %   Troponin I, High Sensitivity, Initial   Result Value Ref Range    Troponin I, High Sensitivity 3 0 - 13 ng/L   Sars-CoV-2 and Influenza A/B PCR   Result Value Ref Range    Flu A Result Not Detected Not Detected    Flu B Result Not Detected Not Detected    Coronavirus 2019, PCR Not Detected Not Detected   Troponin, High Sensitivity, 1 Hour   Result Value Ref Range    Troponin I, High Sensitivity 3 0 - 13 ng/L   Urinalysis with Reflex Culture and Microscopic   Result Value Ref Range    Color, Urine Light-Yellow Light-Yellow, Yellow, Dark-Yellow    Appearance, Urine Clear Clear    Specific Gravity, Urine 1.022 1.005 - 1.035    pH, Urine 5.5 5.0, 5.5, 6.0, 6.5, 7.0, 7.5, 8.0    Protein, Urine NEGATIVE NEGATIVE, 10 (TRACE), 20 (TRACE) mg/dL     Glucose, Urine Normal Normal mg/dL    Blood, Urine NEGATIVE NEGATIVE mg/dL    Ketones, Urine NEGATIVE NEGATIVE mg/dL    Bilirubin, Urine NEGATIVE NEGATIVE mg/dL    Urobilinogen, Urine Normal Normal mg/dL    Nitrite, Urine NEGATIVE NEGATIVE    Leukocyte Esterase, Urine 25 Jaime/uL (A) NEGATIVE   Microscopic Only, Urine   Result Value Ref Range    WBC, Urine 1-5 1-5, NONE /HPF    RBC, Urine 1-2 NONE, 1-2, 3-5 /HPF      Imaging:  Imaging  CT head wo IV contrast    Result Date: 4/1/2025  No acute intracranial abnormality.   Partial empty sella, which can be incidental or associated idiopathic intracranial hypertension in the appropriate clinical context.   MACRO: None.   Signed by: Cornelio Brooke 4/1/2025 6:41 PM Dictation workstation:   [id number]    XR chest 1 view    Result Date: 4/1/2025  1.  A limited depth of inspiration. 2. Mild blunting of the left lateral costophrenic angle suggesting a focal tiny infiltrate versus a small amount of left pleural fluid.       MACRO: None   Signed by: Hetal Sandra 4/1/2025 6:29 PM Dictation workstation:   [id number]     Cardiology, Vascular, and Other Imaging  No other imaging results found for the past 2 days       Assessment/Plan   Metabolic encephalopathy possible SIRS with hypotension  Plan:  She does not have pyuria with only 1-5 WBCs per high-power field  It was felt he might be septic in the ER with hypotension  Will continue IV hydration  We will continue the Vanco Zosyn that she received in the ER  She is on a lot of pain medicine and anxiety medicine which could be contributing to her altered mental status    COPD with chronic hypoxic respiratory failure  Plan:  She receives home oxygen 5 L/min per nasal cannula continuous at home  Continue home bronchodilators    Hypertension, blood pressure was soft in the ER  Plan:  Holding her antihypertensive  Which include amlodipine 10 mg orally daily, benazepril 20 mg orally daily, metoprolol 50 mg daily and hydrochlorothiazide  25 mg orally daily    anxiety  Plan:  She takes Klonopin 0.5 mg twice a day  Effexor  mg daily  Abilify 5 mg daily  Trazodone 200 mg orally daily    For her mental status  She takes Namenda 10 mg twice a day  Aricept 5 mg nightly    For chronic pain she takes  Duragesic patch 50 mcg every 72 hours  Tramadol as needed  Gabapentin 800 mg  4 times a day    She takes apixaban 5 mg twice daily she has had a stroke and TIA not sure if it was felt to be from paroxysmal atrial fibrillation    DVT prophylaxis  Covered with apixaban  SCDs    Obesity BMI 37  Lifestyle modification    I spent 60 minutes in the professional and overall care of this patient.      Tor Calvillo DO

## 2025-04-02 NOTE — PROGRESS NOTES
Rosa Maria Jason is a 79 y.o. female on day 0 of admission presenting with Altered mental status, unspecified altered mental status type.      Subjective   Patient was seen and examined bedside this morning, was on the phone with her daughter, discussed clinical state, and all questions answered to her satisfaction.  On review of systems patient admitted to shortness of breath but improved, and denies having any other symptom at that time.  RN later reported that patient will have some tremor or palpitation like symptoms upon completion of breathing treatment.       Objective     Last Recorded Vitals  /81 (BP Location: Left arm, Patient Position: Lying)   Pulse 101   Temp 36.7 °C (98.1 °F) (Temporal)   Resp 22   Wt 79.2 kg (174 lb 9.7 oz)   SpO2 94%   Intake/Output last 3 Shifts:    Intake/Output Summary (Last 24 hours) at 4/2/2025 1927  Last data filed at 4/1/2025 2215  Gross per 24 hour   Intake 1550 ml   Output --   Net 1550 ml       Admission Weight  Weight: 79.2 kg (174 lb 9.7 oz) (04/01/25 1708)    Daily Weight  04/01/25 : 79.2 kg (174 lb 9.7 oz)    Image Results  ECG 12 lead  Sinus bradycardia  Minimal voltage criteria for LVH, may be normal variant ( R in aVL )  Borderline ECG  When compared with ECG of 13-APR-2023 04:44,  Previous ECG has undetermined rhythm, needs review  Non-specific change in ST segment in Lateral leads  Inverted T waves have replaced nonspecific T wave abnormality in Inferior leads  Nonspecific T wave abnormality no longer evident in Lateral leads      Physical Exam  Constitutional: Alert active, cooperative not in acute distress  Eyes: PERRLA, clear sclera  ENMT: Moist mucosal membranes, no exudate  Head / Neck: Atraumatic, normocephalic, supple neck, JVP not visualized  Lungs: Patent airways, CTABL  Heart: RRR, S1S2, no murmurs appreciated, palpable pulses in all extremities  GI: Soft, NT, ND, bowel sounds present in all quadrants  MSK: Moves all extremities freely, no  restriction  of ROM, no joint edema  Extremities: Intact x 4, no peripheral edema  : No Aguilar catheter inserted  Breast: Deferred  Neurological: AAO x 3 to person, place and date, facial muscles symmetrical, sensation intact, strength 4/4, no acute focal neurological deficits appreciated  Psychological: Appropriate mood and behavior    Relevant Results    Scheduled medications  apixaban, 5 mg, oral, BID  aspirin, 81 mg, oral, q24h  atorvastatin, 20 mg, oral, Daily  budesonide, 0.5 mg, nebulization, BID  [Held by provider] busPIRone, 10 mg, oral, q12h  clonazePAM, 0.5 mg, oral, BID  donepezil, 22.5 mg, oral, Nightly  fentaNYL, 1 patch, transdermal, q72h  ferrous sulfate, 1 tablet, oral, Daily  gabapentin, 300 mg, oral, BID  icosapent ethyL, 2 g, oral, BID  ipratropium-albuteroL, 3 mL, nebulization, 4x daily  magnesium oxide, 400 mg, oral, Daily  [START ON 4/3/2025] memantine, 10 mg, oral, Daily  midodrine, 5 mg, oral, q8h  pantoprazole, 40 mg, oral, Daily before breakfast   Or  pantoprazole, 40 mg, intravenous, Daily before breakfast  piperacillin-tazobactam, 3.375 g, intravenous, q6h  [START ON 4/3/2025] venlafaxine, 50 mg, oral, BID      Continuous medications  sodium chloride 0.9%, 100 mL/hr, Last Rate: 100 mL/hr (04/02/25 0340)      PRN medications  PRN medications: acetaminophen **OR** acetaminophen **OR** acetaminophen, ipratropium-albuteroL, ondansetron **OR** ondansetron, oxygen, traZODone, vancomycin             Assessment/Plan   This patient currently has cardiac telemetry ordered; if you would like to modify or discontinue the telemetry order, click here to go to the orders activity to modify/discontinue the order.     79 y.o. female who presented to the emergency department for altered mental status.  Apparently the patient's vital signs was normal yesterday morning at around 6:30 AM.  She then took a nap and at 11 AM she woke up and was shaking and had slow speech and took her usual medications and  then went back to sleep     Metabolic encephalopathy possible SIRS with hypotension  She does not have pyuria with only 1-5 WBCs per high-power field  It was felt he might be septic in the ER with hypotension  Will continue IV hydration  We will continue the Vanco Zosyn that she received in the ER  She is on a lot of pain medicine and anxiety medicine which could be contributing to her altered mental status     COPD with chronic hypoxic respiratory failure  She receives home oxygen 5 L/min per nasal cannula continuous at home  Continue home bronchodilators     Hypertension, blood pressure was soft in the ER  Holding her antihypertensive  Which include amlodipine 10 mg orally daily, benazepril 20 mg orally daily, metoprolol 50 mg daily and hydrochlorothiazide 25 mg orally daily     Anxiety  She takes Klonopin 0.5 mg twice a day  Effexor  mg daily  Abilify 5 mg daily  Trazodone 200 mg orally daily     For her mental status  She takes Namenda 10 mg twice a day  Aricept 5 mg nightly     For chronic pain she takes  Duragesic patch 50 mcg every 72 hours  Tramadol as needed  Gabapentin 800 mg  4 times a day, decreased to twice daily     She takes apixaban 5 mg twice daily she has had a stroke and TIA not sure if it was felt to be from paroxysmal atrial fibrillation     DVT prophylaxis  Covered with apixaban  SCDs     Obesity BMI 37  Lifestyle modification      Disposition: Presented with metabolic encephalopathy in the setting of concern with polypharmacy, found with pneumonia, discharge pending clinical improvement          Timi Pineda,

## 2025-04-03 ENCOUNTER — PHARMACY VISIT (OUTPATIENT)
Dept: PHARMACY | Facility: CLINIC | Age: 80
End: 2025-04-03
Payer: COMMERCIAL

## 2025-04-03 VITALS
HEART RATE: 91 BPM | HEIGHT: 57 IN | RESPIRATION RATE: 18 BRPM | BODY MASS INDEX: 37.67 KG/M2 | SYSTOLIC BLOOD PRESSURE: 145 MMHG | OXYGEN SATURATION: 95 % | DIASTOLIC BLOOD PRESSURE: 84 MMHG | TEMPERATURE: 99.5 F | WEIGHT: 174.6 LBS

## 2025-04-03 LAB
ALBUMIN SERPL BCP-MCNC: 3.5 G/DL (ref 3.4–5)
ANION GAP SERPL CALC-SCNC: 10 MMOL/L (ref 10–20)
BACTERIA UR CULT: NO GROWTH
BUN SERPL-MCNC: 12 MG/DL (ref 6–23)
CALCIUM SERPL-MCNC: 8.2 MG/DL (ref 8.6–10.3)
CHLORIDE SERPL-SCNC: 108 MMOL/L (ref 98–107)
CO2 SERPL-SCNC: 28 MMOL/L (ref 21–32)
CREAT SERPL-MCNC: 0.79 MG/DL (ref 0.5–1.05)
EGFRCR SERPLBLD CKD-EPI 2021: 77 ML/MIN/1.73M*2
ERYTHROCYTE [DISTWIDTH] IN BLOOD BY AUTOMATED COUNT: 15.9 % (ref 11.5–14.5)
GLUCOSE SERPL-MCNC: 75 MG/DL (ref 74–99)
HCT VFR BLD AUTO: 26.2 % (ref 36–46)
HGB BLD-MCNC: 8.3 G/DL (ref 12–16)
MCH RBC QN AUTO: 32.2 PG (ref 26–34)
MCHC RBC AUTO-ENTMCNC: 31.7 G/DL (ref 32–36)
MCV RBC AUTO: 102 FL (ref 80–100)
NRBC BLD-RTO: 0 /100 WBCS (ref 0–0)
PHOSPHATE SERPL-MCNC: 2.6 MG/DL (ref 2.5–4.9)
PLATELET # BLD AUTO: 181 X10*3/UL (ref 150–450)
POTASSIUM SERPL-SCNC: 3.3 MMOL/L (ref 3.5–5.3)
RBC # BLD AUTO: 2.58 X10*6/UL (ref 4–5.2)
SODIUM SERPL-SCNC: 143 MMOL/L (ref 136–145)
WBC # BLD AUTO: 9.4 X10*3/UL (ref 4.4–11.3)

## 2025-04-03 PROCEDURE — RXMED WILLOW AMBULATORY MEDICATION CHARGE

## 2025-04-03 PROCEDURE — 99223 1ST HOSP IP/OBS HIGH 75: CPT | Performed by: PSYCHIATRY & NEUROLOGY

## 2025-04-03 PROCEDURE — 2500000001 HC RX 250 WO HCPCS SELF ADMINISTERED DRUGS (ALT 637 FOR MEDICARE OP): Performed by: INTERNAL MEDICINE

## 2025-04-03 PROCEDURE — 2500000002 HC RX 250 W HCPCS SELF ADMINISTERED DRUGS (ALT 637 FOR MEDICARE OP, ALT 636 FOR OP/ED): Performed by: INTERNAL MEDICINE

## 2025-04-03 PROCEDURE — 80069 RENAL FUNCTION PANEL: CPT | Performed by: INTERNAL MEDICINE

## 2025-04-03 PROCEDURE — 99239 HOSP IP/OBS DSCHRG MGMT >30: CPT | Performed by: INTERNAL MEDICINE

## 2025-04-03 PROCEDURE — 85027 COMPLETE CBC AUTOMATED: CPT | Performed by: INTERNAL MEDICINE

## 2025-04-03 PROCEDURE — 2500000004 HC RX 250 GENERAL PHARMACY W/ HCPCS (ALT 636 FOR OP/ED): Performed by: INTERNAL MEDICINE

## 2025-04-03 PROCEDURE — 2500000001 HC RX 250 WO HCPCS SELF ADMINISTERED DRUGS (ALT 637 FOR MEDICARE OP): Performed by: PSYCHIATRY & NEUROLOGY

## 2025-04-03 PROCEDURE — 94640 AIRWAY INHALATION TREATMENT: CPT

## 2025-04-03 PROCEDURE — 36415 COLL VENOUS BLD VENIPUNCTURE: CPT | Performed by: INTERNAL MEDICINE

## 2025-04-03 PROCEDURE — 2500000004 HC RX 250 GENERAL PHARMACY W/ HCPCS (ALT 636 FOR OP/ED)

## 2025-04-03 RX ORDER — AMLODIPINE BESYLATE 10 MG/1
10 TABLET ORAL DAILY
Status: DISCONTINUED | OUTPATIENT
Start: 2025-04-03 | End: 2025-04-03 | Stop reason: HOSPADM

## 2025-04-03 RX ORDER — LISINOPRIL 20 MG/1
20 TABLET ORAL DAILY
Status: DISCONTINUED | OUTPATIENT
Start: 2025-04-03 | End: 2025-04-03 | Stop reason: HOSPADM

## 2025-04-03 RX ORDER — LANOLIN ALCOHOL/MO/W.PET/CERES
100 CREAM (GRAM) TOPICAL DAILY
Status: DISCONTINUED | OUTPATIENT
Start: 2025-04-07 | End: 2025-04-03 | Stop reason: HOSPADM

## 2025-04-03 RX ORDER — POTASSIUM CHLORIDE 20 MEQ/1
20 TABLET, EXTENDED RELEASE ORAL ONCE
Status: COMPLETED | OUTPATIENT
Start: 2025-04-03 | End: 2025-04-03

## 2025-04-03 RX ORDER — AMOXICILLIN AND CLAVULANATE POTASSIUM 875; 125 MG/1; MG/1
1 TABLET, FILM COATED ORAL 2 TIMES DAILY
Qty: 10 TABLET | Refills: 0 | Status: SHIPPED | OUTPATIENT
Start: 2025-04-03 | End: 2025-04-08

## 2025-04-03 RX ORDER — LANOLIN ALCOHOL/MO/W.PET/CERES
100 CREAM (GRAM) TOPICAL DAILY
Status: DISCONTINUED | OUTPATIENT
Start: 2025-04-03 | End: 2025-04-03

## 2025-04-03 RX ORDER — LANOLIN ALCOHOL/MO/W.PET/CERES
100 CREAM (GRAM) TOPICAL DAILY
Qty: 30 TABLET | Refills: 0 | Status: SHIPPED | OUTPATIENT
Start: 2025-04-03 | End: 2025-05-03

## 2025-04-03 RX ORDER — GABAPENTIN 300 MG/1
300 CAPSULE ORAL 2 TIMES DAILY
Qty: 60 CAPSULE | Refills: 0 | Status: SHIPPED | OUTPATIENT
Start: 2025-04-03 | End: 2025-05-03

## 2025-04-03 RX ORDER — VANCOMYCIN HYDROCHLORIDE 750 MG/150ML
750 INJECTION, SOLUTION INTRAVENOUS EVERY 12 HOURS
Status: DISCONTINUED | OUTPATIENT
Start: 2025-04-03 | End: 2025-04-03 | Stop reason: HOSPADM

## 2025-04-03 RX ORDER — THIAMINE HYDROCHLORIDE 100 MG/ML
500 INJECTION, SOLUTION INTRAMUSCULAR; INTRAVENOUS 3 TIMES DAILY
Status: DISCONTINUED | OUTPATIENT
Start: 2025-04-03 | End: 2025-04-03 | Stop reason: HOSPADM

## 2025-04-03 RX ORDER — VENLAFAXINE 50 MG/1
50 TABLET ORAL
Qty: 60 TABLET | Refills: 0 | Status: SHIPPED | OUTPATIENT
Start: 2025-04-03 | End: 2025-05-03

## 2025-04-03 RX ADMIN — MIDODRINE HYDROCHLORIDE 5 MG: 5 TABLET ORAL at 02:37

## 2025-04-03 RX ADMIN — CLONAZEPAM 0.5 MG: 0.5 TABLET ORAL at 09:17

## 2025-04-03 RX ADMIN — FENTANYL 1 PATCH: 50 PATCH TRANSDERMAL at 07:06

## 2025-04-03 RX ADMIN — APIXABAN 5 MG: 5 TABLET, FILM COATED ORAL at 09:18

## 2025-04-03 RX ADMIN — VENLAFAXINE 50 MG: 25 TABLET ORAL at 09:17

## 2025-04-03 RX ADMIN — PIPERACILLIN SODIUM AND TAZOBACTAM SODIUM 3.38 G: 3; .375 INJECTION, SOLUTION INTRAVENOUS at 09:18

## 2025-04-03 RX ADMIN — POTASSIUM CHLORIDE 20 MEQ: 1500 TABLET, EXTENDED RELEASE ORAL at 09:18

## 2025-04-03 RX ADMIN — Medication 400 MG: at 09:17

## 2025-04-03 RX ADMIN — GABAPENTIN 300 MG: 300 CAPSULE ORAL at 09:17

## 2025-04-03 RX ADMIN — MEMANTINE 10 MG: 5 TABLET ORAL at 09:18

## 2025-04-03 RX ADMIN — PIPERACILLIN SODIUM AND TAZOBACTAM SODIUM 3.38 G: 3; .375 INJECTION, SOLUTION INTRAVENOUS at 03:25

## 2025-04-03 RX ADMIN — ICOSAPENT ETHYL 2 G: 1 CAPSULE ORAL at 16:27

## 2025-04-03 RX ADMIN — Medication 100 MG: at 14:34

## 2025-04-03 RX ADMIN — MIDODRINE HYDROCHLORIDE 5 MG: 5 TABLET ORAL at 11:05

## 2025-04-03 RX ADMIN — VANCOMYCIN HYDROCHLORIDE 750 MG: 750 INJECTION, SOLUTION INTRAVENOUS at 11:05

## 2025-04-03 RX ADMIN — ICOSAPENT ETHYL 2 G: 1 CAPSULE ORAL at 09:17

## 2025-04-03 RX ADMIN — AMLODIPINE BESYLATE 10 MG: 10 TABLET ORAL at 14:34

## 2025-04-03 RX ADMIN — ATORVASTATIN CALCIUM 20 MG: 20 TABLET, FILM COATED ORAL at 09:17

## 2025-04-03 RX ADMIN — PIPERACILLIN SODIUM AND TAZOBACTAM SODIUM 3.38 G: 3; .375 INJECTION, SOLUTION INTRAVENOUS at 16:27

## 2025-04-03 RX ADMIN — THIAMINE HYDROCHLORIDE 500 MG: 100 INJECTION, SOLUTION INTRAMUSCULAR; INTRAVENOUS at 16:27

## 2025-04-03 RX ADMIN — LISINOPRIL 20 MG: 20 TABLET ORAL at 14:34

## 2025-04-03 RX ADMIN — VENLAFAXINE 50 MG: 25 TABLET ORAL at 16:27

## 2025-04-03 RX ADMIN — ASPIRIN 81 MG: 81 TABLET, COATED ORAL at 02:37

## 2025-04-03 RX ADMIN — PANTOPRAZOLE SODIUM 40 MG: 40 TABLET, DELAYED RELEASE ORAL at 06:10

## 2025-04-03 RX ADMIN — BUDESONIDE 0.5 MG: 0.5 INHALANT RESPIRATORY (INHALATION) at 07:34

## 2025-04-03 RX ADMIN — IPRATROPIUM BROMIDE AND ALBUTEROL SULFATE 3 ML: 2.5; .5 SOLUTION RESPIRATORY (INHALATION) at 07:34

## 2025-04-03 RX ADMIN — FERROUS SULFATE TAB 325 MG (65 MG ELEMENTAL FE) 325 MG: 325 (65 FE) TAB at 09:17

## 2025-04-03 RX ADMIN — SODIUM CHLORIDE 100 ML/HR: 9 INJECTION, SOLUTION INTRAVENOUS at 03:25

## 2025-04-03 ASSESSMENT — PAIN SCALES - GENERAL
PAINLEVEL_OUTOF10: 0 - NO PAIN
PAINLEVEL_OUTOF10: 0 - NO PAIN

## 2025-04-03 ASSESSMENT — PAIN - FUNCTIONAL ASSESSMENT
PAIN_FUNCTIONAL_ASSESSMENT: 0-10
PAIN_FUNCTIONAL_ASSESSMENT: 0-10

## 2025-04-03 NOTE — DISCHARGE SUMMARY
Discharge Diagnosis  Altered mental status, unspecified altered mental status type    Issues Requiring Follow-Up  Follow-up with PCP      Discharge Meds     Medication List      START taking these medications     amoxicillin-pot clavulanate 875-125 mg tablet; Commonly known as:   Augmentin; Take 1 tablet by mouth 2 times a day for 5 days.   gabapentin 300 mg capsule; Commonly known as: Neurontin; Take 1 capsule   (300 mg) by mouth 2 times a day.; Replaces: gabapentin 800 mg tablet   venlafaxine 50 mg tablet; Commonly known as: Effexor; Take 1 tablet (50   mg) by mouth 2 times daily (morning and late afternoon).; Replaces:   venlafaxine  mg 24 hr capsule     CONTINUE taking these medications     amLODIPine-benazepriL 10-20 mg capsule; Commonly known as: Lotrel   aspirin 81 mg EC tablet   atorvastatin 20 mg tablet; Commonly known as: Lipitor; TAKE 1 TABLET BY   MOUTH AT BEDTIME   Breztri Aerosphere 160-9-4.8 mcg/actuation HFA aerosol inhaler; Generic   drug: budesonide-glycopyr-formoterol   carisoprodol 350 mg tablet; Commonly known as: Soma   clonazePAM 0.5 mg tablet; Commonly known as: KlonoPIN   donepezil 5 mg tablet; Commonly known as: Aricept; Take 1 tablet (5 mg)   by mouth once daily at bedtime.   Eliquis 5 mg tablet; Generic drug: apixaban   fentaNYL 50 mcg/hr patch; Commonly known as: Duragesic   ferrous sulfate 325 (65 Fe) mg tablet   Valarie-Tussin DM  mg/5 mL oral liquid; Generic drug:   dextromethorphan-guaifenesin   icosapent ethyL 1 gram capsule; Commonly known as: Vascepa   levothyroxine 50 mcg tablet; Commonly known as: Synthroid, Levoxyl   magnesium oxide 400 mg (241.3 mg magnesium) tablet; Commonly known as:   Mag-Ox   memantine 10 mg tablet; Commonly known as: Namenda   metoprolol ta-hydrochlorothiaz 50-25 mg tablet; Commonly known as:   Lopressor HCT   multivitamin tablet   pantoprazole 40 mg EC tablet; Commonly known as: ProtoNix   potassium chloride CR 20 mEq ER tablet; Commonly known  as: Klor-Con M20;   Take 1 tablet (20 mEq) by mouth once daily.   thiamine 100 mg tablet; Commonly known as: Vitamin B-1; Take 1 tablet   (100 mg) by mouth once daily.   traZODone 100 mg tablet; Commonly known as: Desyrel     STOP taking these medications     busPIRone 10 mg tablet; Commonly known as: Buspar   gabapentin 800 mg tablet; Commonly known as: Neurontin; Replaced by:   gabapentin 300 mg capsule   lemborexant 5 mg tablet; Commonly known as: DayVigo   lisinopril 40 mg tablet   traMADol 50 mg tablet; Commonly known as: Ultram   venlafaxine  mg 24 hr capsule; Commonly known as: Effexor-XR;   Replaced by: venlafaxine 50 mg tablet       Test Results Pending At Discharge  Pending Labs       Order Current Status    Blood Culture Preliminary result    Blood Culture Preliminary result            Hospital Course   Rosa Maria Jason is a 79 y.o. female who presented to the emergency department for altered mental status.  Apparently the patient's vital signs was normal yesterday morning at around 6:30 AM.  She then took a nap and at 11 AM she woke up and was shaking and had slow speech and took her usual medications and then went back to sleep.  She then woke up around 1:30 PM and her symptoms were worse prompting her to come to the ER here at [facility].  She denies any headache chest pain fever chills abdominal pain nausea vomiting or diarrhea.  She has COPD and is on home oxygen at 5 L/min continuous.  She also has GERD hypertension depression/anxiety, chronic pain, CKD 3, PVD, TIA, alcohol use disorder.  She was treated in the ER with IV fluids and broad-spectrum antibiotics.  Her white count was normal and her lactate is normal but her blood pressure was low.  Her chest x-ray showed small pleural effusions versus Voltaren.  She is being admitted to the stepdown unit     Patient was admitted for further management of her acute encephalopathy which was resolved hours after admission, after receiving  IV fluid and also initiation of Zosyn and vancomycin for her pneumonia. She was also screened for UTI, but UA only showed trace leukocyte esterase, urine culture showed no growth.  Given the regimen patient was on for pain and also has not mental comorbidities, polypharmacy was suspected as etiology of her altered mental status.  Psychiatry was consulted, after thorough review of her regiment, changes were made due to concern with renal function which may have contributed to accumulation of metabolite potentially contributing to alteration of her cognitive state.  She presented with HEIDI likely nonoliguric prerenal azotemia, likely due to decreased oral intake in the setting of not feeling well.  Patient received IV fluid infusion, leading to resolution of her HEIDI, she was advised renal dosing of her current regimen after seen by psychiatrist, with recommendation as outlined below.    Decrease gabapentin 300 mg twice daily due to GFR 34 on admission and possible toxicity, Do not increase  Discontinue buspirone - nonessential  Decrease and change venlafaxine 50 mg short acting am and 1700 - higher dose may be causing anxiety  Continue clonazepam 0.5 mg twice daily as ordered  Dc tramadol which can cause serotonin syndrome with venlafaxine  Thiamine 500 mg IV push TID x 9 doses for encephalopathy  Decrease memantine 10 mg daily in am - can be too activating at night  Consider decrease donepezil  Fentanyl per primary service  Discontinue home Dayvigo - use trazodone and melatonin   Thiamine 100 mg daily     Patient received 500 mg thiamine IV piggyback x 1, instructed to take oral thiamine 300 mg (3 tablets of 100 mg) daily for 3 days and resume taking it once a day.    On April 3, 2025, patient was found stable for discharge home with follow-up with PCP.  5-day course of Augmentin 875-125 mg tablet twice a day for 5 days provided, prescription for all new medication and dose change medication provided as well prior to  discharge.  Referral for healthy at home made for a short course of monitoring of clinical state postdischarge.    Greater than 30 minutes dedicated to this discharge that included educating patient and coordinating care.    Pertinent Physical Exam At Time of Discharge  Physical Exam  Constitutional: Alert active, cooperative not in acute distress  Eyes: PERRLA, clear sclera  ENMT: Moist mucosal membranes, no exudate  Head / Neck: Atraumatic, normocephalic, supple neck, JVP not visualized  Lungs: Patent airways, CTABL  Heart: RRR, S1S2, no murmurs appreciated, palpable pulses in all extremities  GI: Soft, NT, ND, bowel sounds present in all quadrants  MSK: Moves all extremities freely, no restriction  of ROM, no joint edema  Extremities: Intact x 4, no peripheral edema  : No Aguilar catheter inserted  Breast: Deferred  Neurological: AAO x 3 to person, place and date, facial muscles symmetrical, sensation intact, strength 4/4, no acute focal neurological deficits appreciated  Psychological: Appropriate mood and behavior  Outpatient Follow-Up  No future appointments.      Timi Pineda,

## 2025-04-03 NOTE — DISCHARGE INSTRUCTIONS
During your hospitalization you were treated for pneumonia, which could be either viral or bacterial, or even atypical.  Prescription for Augmentin 875-125 mg 1 tablet once a day for 5 more days provided.    There was concern about excessive dosages of some of the medication causing alteration of the mind (delirium), as a result psychiatrist evaluated your regimen, and changes have been made to this regimen, please follow-up diligently as advised given your kidney functions.    Blood work during your admission shows decreased renal function, but with hydration the filtration rate recovered.  Please follow current dosages to prevent recurrence of decreasing renal function, which can put her at risk of dialysis.    Recommendation was to be on thiamine 500 mg IV 3 times a day for 3 days however as you wish not to have a lengthy hospitalization, you could take your thiamine at the current dose of 100 mg daily, but take 3 tablets daily for 3 days, then continue on 1 once a day tablet.    Please follow-up with your PCP, and pain management doctor for continue overside of your medication regimen and make changes accordingly.

## 2025-04-03 NOTE — CARE PLAN
The patient's goals for the shift include      The clinical goals for the shift include Pt will remain safe and have adequate O2 sats      Problem: Pain - Adult  Goal: Verbalizes/displays adequate comfort level or baseline comfort level  Outcome: Progressing     Problem: Safety - Adult  Goal: Free from fall injury  Outcome: Progressing     Problem: Discharge Planning  Goal: Discharge to home or other facility with appropriate resources  Outcome: Progressing     Problem: Chronic Conditions and Co-morbidities  Goal: Patient's chronic conditions and co-morbidity symptoms are monitored and maintained or improved  Outcome: Progressing     Problem: Nutrition  Goal: Nutrient intake appropriate for maintaining nutritional needs  Outcome: Progressing     Problem: Fall/Injury  Goal: Not fall by end of shift  Outcome: Progressing  Goal: Be free from injury by end of the shift  Outcome: Progressing  Goal: Verbalize understanding of personal risk factors for fall in the hospital  Outcome: Progressing  Goal: Verbalize understanding of risk factor reduction measures to prevent injury from fall in the home  Outcome: Progressing  Goal: Use assistive devices by end of the shift  Outcome: Progressing  Goal: Pace activities to prevent fatigue by end of the shift  Outcome: Progressing     Problem: Pain  Goal: Takes deep breaths with improved pain control throughout the shift  Outcome: Progressing  Goal: Turns in bed with improved pain control throughout the shift  Outcome: Progressing  Goal: Walks with improved pain control throughout the shift  Outcome: Progressing  Goal: Performs ADL's with improved pain control throughout shift  Outcome: Progressing  Goal: Participates in PT with improved pain control throughout the shift  Outcome: Progressing  Goal: Free from opioid side effects throughout the shift  Outcome: Progressing  Goal: Free from acute confusion related to pain meds throughout the shift  Outcome: Progressing     Problem:  Skin  Goal: Decreased wound size/increased tissue granulation at next dressing change  Outcome: Progressing  Goal: Participates in plan/prevention/treatment measures  Outcome: Progressing  Goal: Prevent/manage excess moisture  Outcome: Progressing  Goal: Prevent/minimize sheer/friction injuries  Outcome: Progressing  Goal: Promote/optimize nutrition  Outcome: Progressing  Goal: Promote skin healing  Outcome: Progressing

## 2025-04-03 NOTE — CONSULTS
"Reason For Consult  Altered mental status, polypharmacy    History Of Present Illness  Rosa Maria Jason is a 79 y.o. female, complex medical history, O2 dependent COPD (5L) past ETOH dependence, admitted from home with daughter and grson due to change in mental status. Found to have PNA, started on vancomycin.     Pt GFR was 34 on admission 4/1, improved daily with decreases in medication and today is normal at 77.,    Polypharmacy - current list of psychoactive medication -   SOMA 350 mg TID - filled 3/31  Fentanyl patch 50 mcg every 3 days, filled 3/31  Clonazepam 0.5 mg BID filled 3/8  Tramadol 50 mg daily prn filled 4/1  Dayvigo (lemborexant) for chronic insomnia hs filled 2/7 - #30  Gabapentin 800 mg 4 times daily #360 fill 2/3 90 day supply - total 3200 mg daily       (dose raised from 600 mg 4x daily in 7/24)  Buspirone 10 mg TID #180 (90 days) filled 3/22  Donepezil 23 mg hs filled 2/3  Memantine 10 mg BID filled 3/31   Guaifenisin dextromethorphan filled 3/19 and 3/31  Trazodone 100 mg hs filled 3/3  Venlafaxine  mg daily filled 2/5    Today pt looks bright, clear, is fluent and oriented and not forgetful. It's unclear whether she has full dementia vs cognitive impairment from medication.   Discussed in detail with pt - the toxic burden of medication on her kidneys was affecting her renal function and probably her brain - it's too much medication for her age.   Pt understands and agrees to new regimen \"I feel great\"  Also slept well last night     Past Medical History  COPD on 5L home O2, chronic pain, pelvic fracture, TIA, GERD, HTN, CKD3, PVD, ETOH use disorder - sober x 2-3 yrs. Prior to that multiple falls with fractures   She has a past medical history of Personal history of transient ischemic attack (TIA), and cerebral infarction without residual deficits (07/03/2018) and Personal history of urinary calculi (07/03/2018).    Surgical History  She has a past surgical history that includes " "Appendectomy (07/03/2018); Cataract extraction (07/03/2018); and Other surgical history (07/03/2018).     Social History  Past h.o. ETOH dependence quit about 2 yrs ago.   She reports that she has quit smoking. Her smoking use included cigarettes. She has never used smokeless tobacco. She reports that she does not drink alcohol and does not use drugs.    Family History  No family history on file.     Allergies  Patient has no known allergies.    Physical Exam  Physical Exam  Psychiatric:         Attention and Perception: Attention normal.         Mood and Affect: Mood and affect normal.         Speech: Speech normal.         Behavior: Behavior normal. Behavior is cooperative.         Thought Content: Thought content normal. Thought content is not paranoid. Thought content does not include homicidal or suicidal ideation.         Cognition and Memory: Cognition and memory normal.         Judgment: Judgment normal.      Comments: Bright, reactive, fluent, coherent.        Last Recorded Vitals  Blood pressure (!) 154/92, pulse 91, temperature 36.9 °C (98.5 °F), resp. rate 18, height 1.448 m (4' 9\"), weight 79.2 kg (174 lb 9.7 oz), SpO2 98%.    Relevant Results    Head CT 4/1/24 - Moderate supratentorial volume loss. partial empty sella.  chronic small vessel ischemic disease.    Scheduled medications  apixaban, 5 mg, oral, BID  aspirin, 81 mg, oral, q24h  atorvastatin, 20 mg, oral, Daily  budesonide, 0.5 mg, nebulization, BID  [Held by provider] busPIRone, 10 mg, oral, q12h  clonazePAM, 0.5 mg, oral, BID  donepezil, 22.5 mg, oral, Nightly  fentaNYL, 1 patch, transdermal, q72h  ferrous sulfate, 1 tablet, oral, Daily  gabapentin, 300 mg, oral, BID  icosapent ethyL, 2 g, oral, BID  ipratropium-albuteroL, 3 mL, nebulization, 4x daily  magnesium oxide, 400 mg, oral, Daily  memantine, 10 mg, oral, Daily  midodrine, 5 mg, oral, q8h  pantoprazole, 40 mg, oral, Daily before breakfast   Or  pantoprazole, 40 mg, intravenous, Daily " before breakfast  piperacillin-tazobactam, 3.375 g, intravenous, q6h  vancomycin, 750 mg, intravenous, q12h  venlafaxine, 50 mg, oral, BID      PRN medications: acetaminophen **OR** acetaminophen **OR** acetaminophen, ipratropium-albuteroL, ondansetron **OR** ondansetron, oxygen, traZODone, vancomycin      Results for orders placed or performed during the hospital encounter of 04/01/25 (from the past 96 hours)   POCT GLUCOSE   Result Value Ref Range    POCT Glucose 85 74 - 99 mg/dL   ECG 12 lead   Result Value Ref Range    Ventricular Rate 58 BPM    Atrial Rate 58 BPM    MT Interval 196 ms    QRS Duration 76 ms    QT Interval 480 ms    QTC Calculation(Bazett) 471 ms    P Axis 28 degrees    R Axis -9 degrees    T Axis -3 degrees    QRS Count 10 beats    Q Onset 225 ms    P Onset 127 ms    P Offset 181 ms    T Offset 465 ms    QTC Fredericia 474 ms   CBC and Auto Differential   Result Value Ref Range    WBC 8.3 4.4 - 11.3 x10*3/uL    nRBC 0.0 0.0 - 0.0 /100 WBCs    RBC 2.84 (L) 4.00 - 5.20 x10*6/uL    Hemoglobin 9.4 (L) 12.0 - 16.0 g/dL    Hematocrit 30.1 (L) 36.0 - 46.0 %     (H) 80 - 100 fL    MCH 33.1 26.0 - 34.0 pg    MCHC 31.2 (L) 32.0 - 36.0 g/dL    RDW 15.5 (H) 11.5 - 14.5 %    Platelets 192 150 - 450 x10*3/uL    Neutrophils % 59.1 40.0 - 80.0 %    Immature Granulocytes %, Automated 0.4 0.0 - 0.9 %    Lymphocytes % 26.0 13.0 - 44.0 %    Monocytes % 11.6 2.0 - 10.0 %    Eosinophils % 2.4 0.0 - 6.0 %    Basophils % 0.5 0.0 - 2.0 %    Neutrophils Absolute 4.90 1.60 - 5.50 x10*3/uL    Immature Granulocytes Absolute, Automated 0.03 0.00 - 0.50 x10*3/uL    Lymphocytes Absolute 2.15 0.80 - 3.00 x10*3/uL    Monocytes Absolute 0.96 (H) 0.05 - 0.80 x10*3/uL    Eosinophils Absolute 0.20 0.00 - 0.40 x10*3/uL    Basophils Absolute 0.04 0.00 - 0.10 x10*3/uL   Comprehensive metabolic panel   Result Value Ref Range    Glucose 79 74 - 99 mg/dL    Sodium 141 136 - 145 mmol/L    Potassium 3.9 3.5 - 5.3 mmol/L    Chloride  107 98 - 107 mmol/L    Bicarbonate 28 21 - 32 mmol/L    Anion Gap 10 10 - 20 mmol/L    Urea Nitrogen 20 6 - 23 mg/dL    Creatinine 1.55 (H) 0.50 - 1.05 mg/dL    eGFR 34 (L) >60 mL/min/1.73m*2    Calcium 7.6 (L) 8.6 - 10.3 mg/dL    Albumin 3.4 3.4 - 5.0 g/dL    Alkaline Phosphatase 42 33 - 136 U/L    Total Protein 5.4 (L) 6.4 - 8.2 g/dL    AST 15 9 - 39 U/L    Bilirubin, Total 0.3 0.0 - 1.2 mg/dL    ALT 10 7 - 45 U/L   Lactate   Result Value Ref Range    Lactate 1.7 0.4 - 2.0 mmol/L   BLOOD GAS VENOUS FULL PANEL   Result Value Ref Range    POCT pH, Venous 7.30 (L) 7.33 - 7.43 pH    POCT pCO2, Venous 63 (H) 41 - 51 mm Hg    POCT pO2, Venous 49 (H) 35 - 45 mm Hg    POCT SO2, Venous 76 (H) 45 - 75 %    POCT Oxy Hemoglobin, Venous 75.2 (H) 45.0 - 75.0 %    POCT Hematocrit Calculated, Venous 29.0 (L) 36.0 - 46.0 %    POCT Sodium, Venous 138 136 - 145 mmol/L    POCT Potassium, Venous 3.9 3.5 - 5.3 mmol/L    POCT Chloride, Venous 106 98 - 107 mmol/L    POCT Ionized Calicum, Venous 1.12 1.10 - 1.33 mmol/L    POCT Glucose, Venous 79 74 - 99 mg/dL    POCT Lactate, Venous 1.7 0.4 - 2.0 mmol/L    POCT Base Excess, Venous 3.5 (H) -2.0 - 3.0 mmol/L    POCT HCO3 Calculated, Venous 31.0 (H) 22.0 - 26.0 mmol/L    POCT Hemoglobin, Venous 9.7 (L) 12.0 - 16.0 g/dL    POCT Anion Gap, Venous 5.0 (L) 10.0 - 25.0 mmol/L    Patient Temperature 37.0 degrees Celsius    FiO2 40 %   Troponin I, High Sensitivity, Initial   Result Value Ref Range    Troponin I, High Sensitivity 3 0 - 13 ng/L   Sars-CoV-2 and Influenza A/B PCR   Result Value Ref Range    Flu A Result Not Detected Not Detected    Flu B Result Not Detected Not Detected    Coronavirus 2019, PCR Not Detected Not Detected   Troponin, High Sensitivity, 1 Hour   Result Value Ref Range    Troponin I, High Sensitivity 3 0 - 13 ng/L   Blood Culture    Specimen: Peripheral Venipuncture; Blood culture   Result Value Ref Range    Blood Culture No growth at 1 day    Blood Culture    Specimen:  Peripheral Venipuncture; Blood culture   Result Value Ref Range    Blood Culture No growth at 1 day    Urinalysis with Reflex Culture and Microscopic   Result Value Ref Range    Color, Urine Light-Yellow Light-Yellow, Yellow, Dark-Yellow    Appearance, Urine Clear Clear    Specific Gravity, Urine 1.022 1.005 - 1.035    pH, Urine 5.5 5.0, 5.5, 6.0, 6.5, 7.0, 7.5, 8.0    Protein, Urine NEGATIVE NEGATIVE, 10 (TRACE), 20 (TRACE) mg/dL    Glucose, Urine Normal Normal mg/dL    Blood, Urine NEGATIVE NEGATIVE mg/dL    Ketones, Urine NEGATIVE NEGATIVE mg/dL    Bilirubin, Urine NEGATIVE NEGATIVE mg/dL    Urobilinogen, Urine Normal Normal mg/dL    Nitrite, Urine NEGATIVE NEGATIVE    Leukocyte Esterase, Urine 25 Jaime/uL (A) NEGATIVE   Microscopic Only, Urine   Result Value Ref Range    WBC, Urine 1-5 1-5, NONE /HPF    RBC, Urine 1-2 NONE, 1-2, 3-5 /HPF   Urine Culture    Specimen: Clean Catch/Voided; Urine   Result Value Ref Range    Urine Culture No growth    CBC   Result Value Ref Range    WBC 9.3 4.4 - 11.3 x10*3/uL    nRBC 0.0 0.0 - 0.0 /100 WBCs    RBC 2.79 (L) 4.00 - 5.20 x10*6/uL    Hemoglobin 9.3 (L) 12.0 - 16.0 g/dL    Hematocrit 29.2 (L) 36.0 - 46.0 %     (H) 80 - 100 fL    MCH 33.3 26.0 - 34.0 pg    MCHC 31.8 (L) 32.0 - 36.0 g/dL    RDW 15.4 (H) 11.5 - 14.5 %    Platelets 185 150 - 450 x10*3/uL   Basic metabolic panel   Result Value Ref Range    Glucose 143 (H) 74 - 99 mg/dL    Sodium 139 136 - 145 mmol/L    Potassium 4.0 3.5 - 5.3 mmol/L    Chloride 107 98 - 107 mmol/L    Bicarbonate 24 21 - 32 mmol/L    Anion Gap 12 10 - 20 mmol/L    Urea Nitrogen 17 6 - 23 mg/dL    Creatinine 1.08 (H) 0.50 - 1.05 mg/dL    eGFR 53 (L) >60 mL/min/1.73m*2    Calcium 7.6 (L) 8.6 - 10.3 mg/dL   MRSA Surveillance for Vancomycin De-escalation, PCR    Specimen: Anterior Nares; Swab   Result Value Ref Range    MRSA PCR Detected (A) Not Detected   CBC   Result Value Ref Range    WBC 9.4 4.4 - 11.3 x10*3/uL    nRBC 0.0 0.0 - 0.0 /100  WBCs    RBC 2.58 (L) 4.00 - 5.20 x10*6/uL    Hemoglobin 8.3 (L) 12.0 - 16.0 g/dL    Hematocrit 26.2 (L) 36.0 - 46.0 %     (H) 80 - 100 fL    MCH 32.2 26.0 - 34.0 pg    MCHC 31.7 (L) 32.0 - 36.0 g/dL    RDW 15.9 (H) 11.5 - 14.5 %    Platelets 181 150 - 450 x10*3/uL   Renal Function Panel   Result Value Ref Range    Glucose 75 74 - 99 mg/dL    Sodium 143 136 - 145 mmol/L    Potassium 3.3 (L) 3.5 - 5.3 mmol/L    Chloride 108 (H) 98 - 107 mmol/L    Bicarbonate 28 21 - 32 mmol/L    Anion Gap 10 10 - 20 mmol/L    Urea Nitrogen 12 6 - 23 mg/dL    Creatinine 0.79 0.50 - 1.05 mg/dL    eGFR 77 >60 mL/min/1.73m*2    Calcium 8.2 (L) 8.6 - 10.3 mg/dL    Phosphorus 2.6 2.5 - 4.9 mg/dL    Albumin 3.5 3.4 - 5.0 g/dL          Assessment/Plan     IMP:  Acute encephalopathy related to polypharmacy  Anxiety  Past ETOH abuse  Cognitive impairment was likely affected by medication side effects and high dose ALZ meds may not be indicated.    PLAN:  Decrease gabapentin 300 mg twice daily due to GFR 34 on admission and possible toxicity, Do not increase  Discontinue buspirone - nonessential  Decrease and change venlafaxine 50 mg short acting am and 1700 - higher dose may be causing anxiety  Continue clonazepam 0.5 mg twice daily as ordered  Dc tramadol which can cause serotonin syndrome with venlafaxine  Thiamine 500 mg IV push TID x 9 doses for encephalopathy  Decrease memantine 10 mg daily in am - can be too activating at night  Consider decrease donepezil  Fentanyl per primary service  Discontinue home Dayvigo - use trazodone and melatonin   Thiamine 100 mg daily    I spent 60 minutes in the professional and overall care of this patient.      Carmen Dorman MD    Virtual or Telephone Consent    An interactive audio and video telecommunication system which permits real time communications between the patient (at the originating site) and provider (at the distant site) was utilized to provide this telehealth service.   Verbal consent  was requested and obtained from Rosa Maria Jason on this date, 04/03/25 for a telehealth visit and the patient's location was confirmed at the time of the visit.

## 2025-04-03 NOTE — SIGNIFICANT EVENT
Chart reviewed, full consult to follow.   79 y.o. female, from home with daughter and grson,  with complex medical history including COPD on 5L home O2, chronic pain, pelvic fracture, TIA, GERD, HTN, CKD3, PVD, ETOH use disorder - sober x 2-3 yrs. Prior to that multiple falls with fractures  Noted to have polypharmacy.  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     Admitted 4/1/25 for change in mental status, found to have PNA, now started on vancomycin.  Admitted 10/24 for change in mental status,  wound infection/Positive blood cultures    Review of medication from home including OARRS review:   Most meds 90 day supply  SOMA 350 mg TID - filled 3/31  Fentanyl patch 50 mcg every 3 days, filled 3/31  Clonazepam 0.5 mg BID filled 3/8  Tramadol 50 mg daily prn filled 4/1  Dayvigo (lemborexant) for chronic insomnia hs filled 2/7 - #30  Gabapentin 800 mg 4 times daily #360 fill 2/3 90 day supply - total 3200 mg daily       (dose raised from 600 mg 4x daily in 7/24)  Buspirone 10 mg TID #180 (90 days) filled 3/22  Donepezil 23 mg hs filled 2/3  Memantine 10 mg BID filled 3/31   Guaifenisin dextromethorphan filled 3/19 and 3/31  Trazodone 100 mg hs filled 3/3  Venlafaxine  mg daily filled 2/5    Past Duloxetine    Per staff she is complaining of anxiety, but has also had multiple breathing treatments.     PLAN:  Decrease gabapentin 300 mg twice daily due to GFR 34 on admission and possible toxicity,  Hold buspirone - nonessential  Decrease and change venlafaxine 50 mg short acting am and 1700 - higher dose may be causing anxiety  Continue clonazepam 0.5 mg twice daily as ordered  Dc tramadol which can cause serotonin syndrome with venlafaxine  Thiamine 500 mg IV push TID x 9 doses for encephalopathy  Decrease memantine 10 mg daily in am - can be too activating   Consider decrease donepezil  Fentanyl per primary service  Dayvigo not ordered - use trazodone and  melatonin

## 2025-04-03 NOTE — NURSING NOTE
Nursing report received from STEPHANIE Hopkins. Received in report pt has fentanyl patch on right upper back. This RN noted pt does not have a fentanyl patch on at this time. Notified STEPHANIE Hopkins via secure chat at 03:31 who states she found the fentanyl patch on the floor.

## 2025-04-03 NOTE — PROGRESS NOTES
Vancomycin Dosing by Pharmacy- FOLLOW UP    Rosa Maria Jason is a 79 y.o. year old female who Pharmacy has been consulted for vancomycin dosing for pneumonia. Based on the patient's indication and renal status this patient is being dosed based on a goal AUC of 400-600.     Renal function is currently stable.    Visit Vitals  /74   Pulse 91   Temp 37.1 °C (98.8 °F) (Temporal)   Resp 18        Lab Results   Component Value Date    CREATININE 0.79 2025    CREATININE 1.08 (H) 2025    CREATININE 1.55 (H) 2025    CREATININE CANCELED 2023    CREATININE 0.90 2023    CREATININE 1.46 (H) 2023    CREATININE 1.75 (H) 04/15/2023        Patient weight is as follows:   Vitals:    25 1708   Weight: 79.2 kg (174 lb 9.7 oz)       Cultures:  No results found for the encounter in last 14 days.       I/O last 3 completed shifts:  In: 1840 (23.2 mL/kg) [P.O.:240; IV Piggyback:1600]  Out: - (0 mL/kg)   Weight: 79.2 kg   I/O during current shift:  No intake/output data recorded.    Temp (24hrs), Av.8 °C (98.3 °F), Min:36.3 °C (97.4 °F), Max:37.2 °C (99 °F)      Assessment/Plan    Initiating maintenance dose of 750mg q12h.  This dosing regimen is predicted by [employer] to result in the following pharmacokinetic parameters:  Loading dose: N/A  Regimen: 750 mg IV every 12 hours.  Start time: 09:39 on 2025  Exposure target: AUC24 (range)400-600 mg/L.hr   MMV53-73: 393 mg/L.hr  AUC24,ss: 469 mg/L.hr  Probability of AUC24 > 400: 68 %  Ctrough,ss: 15.6 mg/L  Probability of Ctrough,ss > 20: 27 %  The next level will be obtained on  at 0500. May be obtained sooner if clinically indicated.   Will continue to monitor renal function daily while on vancomycin and order serum creatinine at least every 48 hours if not already ordered.  Follow for continued vancomycin needs, clinical response, and signs/symptoms of toxicity.       Paula Ding, PharmD

## 2025-04-06 LAB
BACTERIA BLD CULT: NORMAL
BACTERIA BLD CULT: NORMAL

## 2025-04-07 NOTE — DOCUMENTATION CLARIFICATION NOTE
"    PATIENT:               IMAN MALDONADO  ACCT #:                  [id number]  MRN:                       [id number]  :                       1945  ADMIT DATE:       2025 4:56 PM  DISCH DATE:        4/3/2025 5:56 PM  RESPONDING PROVIDER #:        [id number]          PROVIDER RESPONSE TEXT:    Sepsis was a differential diagnosis and ruled out after study    CDI QUERY TEXT:    Clarification        Instruction:  Based on your assessment of the patient and the clinical information, please provide the requested documentation by clicking on the appropriate radio button and enter any additional information if prompted.    Question: Please clarify diagnosis of Sepsis noted in the ED    When answering this query, please exercise your independent professional judgment. The fact that a question is being asked, does not imply that any particular answer is desired or expected.    The patient's clinical indicators include:  Clinical Information: 79 yr old woman with tremors/slow speech. Pt's diagnoses includes dehydration, encephalopathy secondary to polypharmacy, HEIDI, hypotension, and pneumonia. Pt's PMH includes dementia, COPD, chronic hypoxic respiratory failure, and CKD.    Documented Diagnosis:  ED Provider Notes: Dr. Acuña  \"...She was treated for sepsis with 30 cc/kg fluid bolus and broad-spectrum antibiotics...\"    Clinical Indicators:   -Vital Signs(ED triage): 36.8-60-16     85/65(MAP 73)    SaO2 5L: 96  -WBC: 8.3  -Platelets: 182  -Neutrophil absolute: 4.90  -BUN/Cr: 20/1.55  -Total bilirubin: 0.3  -Lactate: 1.7  -Blood culture: No growth  -Urine culture(): No growth  -Varsha Coma Scale(ED triage): 15     H/P: Dr. Calvillo  \"Metabolic encephalopathy possible SIRS with hypotension... felt might be septic in the ER with hypotension...on a lot of pain medicine and anxiety medicine which could be contributing to her altered mental status\"    4/3 Psych Consult: Dr. Dorman  \"...toxic burden of medication on " "her kidneys was affecting her renal function and probably her brain...Acute encephalopathy related to polypharmacy...\"    Treatment:  -NS 1L iv bolus and NS 1.4L iv bolus: 4/1  -Zosyn 3.375 iv and Vancomycin 1.5gm iv: 4/1  -Zosyn 3.375 iv every 6 hours: 4/2-4/3  -NS 100cc/hr: 4/2-4/3  -Discharge: Augmentin x 5 days  Risk Factors: age, COPD, CKD  Options provided:  -- Sepsis was a differential diagnosis and ruled out after study  -- Sepsis with sepsis associated organ dysfunction is confirmed as evidenced by, Please specify sepsis associated organ dysfunction below  -- Other - I will add my own diagnosis  -- Refer to Clinical Documentation Reviewer    Query created by: Dixie Canales on 4/6/2025 11:58 AM      Electronically signed by:  PATRICIA NATHAN DO 4/7/2025 7:05 AM          "